# Patient Record
(demographics unavailable — no encounter records)

---

## 2024-09-30 NOTE — ASSESSMENT
[FreeTextEntry1] : Ms. TREV GREENE, 80 year old female, former smoker, w/ hx of Rt lung surgery for benign finding. Diagnosed with with T3 N0 Mo squamous cell cancer RUL (Jan,2022). Has established care with [facility], completed 4 cycles of Jose M adjuvant chemo 5/2022.  Now s/p Right thoracotomy; right upper lobe extended wedge resection; resection of posterior chest wall including ribs 3, 4, and 5; chest wall reconstruction with [place] patch and hilar and mediastinal lymph node sampling on on 6/21/22. Path revealed squamous cell carcinoma, 2.5 cm, G2, +visceral pleura invasion, and invasion of adjacent rib, all margins and LNs are negative, [id number].  Post op, followed with Dr. Barrett; Continue surveillance for now.  Now s/p Left chest wall mediport removal on 05/07/2024.   Here today for follow up with imaging.  I have independently reviewed the medical records and imaging at the time of this office consultation.  Recommendations reviewed with patient during this office visit, and all questions answered; Patient instructed on the importance of follow up and verbalizes understanding.

## 2024-09-30 NOTE — ASSESSMENT
[FreeTextEntry1] : Ms. TREV GREENE, 80 year old female, former smoker, w/ hx of Rt lung surgery for benign finding. Diagnosed with with T3 N0 Mo squamous cell cancer RUL (Jan,2022). Has established care with [facility], completed 4 cycles of Jose M adjuvant chemo 5/2022.  Now s/p Right thoracotomy; right upper lobe extended wedge resection; resection of posterior chest wall including ribs 3, 4, and 5; chest wall reconstruction with [place] patch and hilar and mediastinal lymph node sampling on on 6/21/22. Path revealed squamous cell carcinoma, 2.5 cm, G2, +visceral pleura invasion, and invasion of adjacent rib, all margins and LNs are negative, [id number].  Post op, followed with Dr. Barrett; Continue surveillance for now.  Now s/p Left chest wall mediport removal on 05/07/2024.   Here today for follow up with imaging.  I have independently reviewed the medical records and imaging at the time of this office consultation.  Recommendations reviewed with patient during this office visit, and all questions answered; Patient instructed on the importance of follow up and verbalizes understanding.       No

## 2024-09-30 NOTE — HISTORY OF PRESENT ILLNESS
[FreeTextEntry1] : Ms. TREV GREENE, 80 year old female, former smoker, w/ hx of Rt lung surgery for benign finding. Diagnosed with with T3 N0 Mo squamous cell cancer RUL (Jan,2022). Has established care with [facility], completed 4 cycles of Jose M adjuvant chemo 5/2022.  Now s/p Right thoracotomy; right upper lobe extended wedge resection; resection of posterior chest wall including ribs 3, 4, and 5; chest wall reconstruction with [place] patch and hilar and mediastinal lymph node sampling on on 6/21/22. Path revealed squamous cell carcinoma, 2.5 cm, G2, +visceral pleura invasion, and invasion of adjacent rib, all margins and LNs are negative, [id number].  Post op, followed with Dr. Barrett; Continue surveillance for now.  CT Chest on 9/12/22: - Post op changes - Mild opacity surrounding the chain sutures and 1 cm nodular subpleural opacity within posterior RUL(series 2 image 42)  PET/CT on 12/14/2022: (Ordered by Dr. Barrett) - Patient is status post interval partial resection of the right upper lobe.  - There is a mildly FDG-avid opacity adjacent to chain sutures (SUV 3.7; image 75). The opacity is grossly unchanged as compared to CT dated 9/12/2022, however, due to differences in technique, comparison is limited. - Previously noted 1 cm nodular subpleural opacity within the posterior right upper lobe is not clearly seen on the current study. A dedicated CT of chest is recommended to assess for interval change.  - Emphysema. - Nonspecific segmental hypermetabolism in sigmoid colon, decreased from prior study.  CT C/A/P w/ IV Contrast on 04/13/2023: - Status post posterior right upper and superior segment right lower lobe wedge resection.  - Previously noted perisutural nodularity has decreased (2:33) since October 2022.  - Emphysema is present. - Stable right supraclavicular, upper and lower right paratracheal, AP window nodes, subcarinal and right hilar nodes measuring up to 1.1 cm short axis.  - Moderate-sized hiatal hernia. - Question an enhancing lesion in the sigmoid colon at site of previous FDG avidity on prior PET/CTs, otherwise incompletely evaluated on this study; recommend correlation with colonoscopy.  CT Chest on 12/08/2023:  - Status post wedge resections in the right lung. - Emphysematous changes are present in both lungs.  - New 0.5 cm ill-defined nodule is noted in the posterior segment of the left upper lobe.  - Small hiatal hernia is noted.  01/03/2024: New YVAN nodule, which I think is mucus. I recommend she returns to clinic in 6 months with CT Chest without contrast.   CT Chest on 03/28/2024: - Right lung wedge resections.  - Unchanged upper lobe predominant emphysema and mild traction bronchiectasis in the right lower lobe. - Resolved previously new left upper lobe nodule. - Partial right rib resections.  - Surgical graft of the posterior right chest wall. - Small hiatal hernia.  Now s/p Left chest wall mediport removal on 05/07/2024.   05/22/2024: Port removal site healing well, no sign of infection. Previously noted YVAN nodule has resolved, I recommend she repeats CT Chest without contrast in 09/2024 and return to clinic after to discuss findings.   CT Chest on 09/19/2024: - Right upper and right lower lobe sublobar resections.  - Emphysema. - Unchanged small mediastinal lymph nodes.  - Small hiatal hernia. - Partial resection of multiple right ribs. - Trace pericardial fluid. Coronary artery calcifications. Unchanged dilated right pulmonary artery.   Patient presents to office for follow up.

## 2024-09-30 NOTE — HISTORY OF PRESENT ILLNESS
[FreeTextEntry1] : Ms. TREV GREENE, 80 year old female, former smoker, w/ hx of Rt lung surgery for benign finding. Diagnosed with with T3 N0 Mo squamous cell cancer RUL (Jan,2022). Has established care with [facility], completed 4 cycles of Jose M adjuvant chemo 5/2022.  Now s/p Right thoracotomy; right upper lobe extended wedge resection; resection of posterior chest wall including ribs 3, 4, and 5; chest wall reconstruction with [place] patch and hilar and mediastinal lymph node sampling on on 6/21/22. Path revealed squamous cell carcinoma, 2.5 cm, G2, +visceral pleura invasion, and invasion of adjacent rib, all margins and LNs are negative, [id number].  Post op, followed with Dr. Barrett; Continue surveillance for now.  CT Chest on 9/12/22: - Post op changes - Mild opacity surrounding the chain sutures and 1 cm nodular subpleural opacity within posterior RUL(series 2 image 42)  PET/CT on 12/14/2022: (Ordered by Dr. Barrett) - Patient is status post interval partial resection of the right upper lobe.  - There is a mildly FDG-avid opacity adjacent to chain sutures (SUV 3.7; image 75). The opacity is grossly unchanged as compared to CT dated 9/12/2022, however, due to differences in technique, comparison is limited. - Previously noted 1 cm nodular subpleural opacity within the posterior right upper lobe is not clearly seen on the current study. A dedicated CT of chest is recommended to assess for interval change.  - Emphysema. - Nonspecific segmental hypermetabolism in sigmoid colon, decreased from prior study.  CT C/A/P w/ IV Contrast on 04/13/2023: - Status post posterior right upper and superior segment right lower lobe wedge resection.  - Previously noted perisutural nodularity has decreased (2:33) since October 2022.  - Emphysema is present. - Stable right supraclavicular, upper and lower right paratracheal, AP window nodes, subcarinal and right hilar nodes measuring up to 1.1 cm short axis.  - Moderate-sized hiatal hernia. - Question an enhancing lesion in the sigmoid colon at site of previous FDG avidity on prior PET/CTs, otherwise incompletely evaluated on this study; recommend correlation with colonoscopy.  CT Chest on 12/08/2023:  - Status post wedge resections in the right lung. - Emphysematous changes are present in both lungs.  - New 0.5 cm ill-defined nodule is noted in the posterior segment of the left upper lobe.  - Small hiatal hernia is noted.  01/03/2024: New YVAN nodule, which I think is mucus. I recommend she returns to clinic in 6 months with CT Chest without contrast.   CT Chest on 03/28/2024: - Right lung wedge resections.  - Unchanged upper lobe predominant emphysema and mild traction bronchiectasis in the right lower lobe. - Resolved previously new left upper lobe nodule. - Partial right rib resections.  - Surgical graft of the posterior right chest wall. - Small hiatal hernia.  Now s/p Left chest wall mediport removal on 05/07/2024.   05/22/2024: Port removal site healing well, no sign of infection. Previously noted YVAN nodule has resolved, I recommend she repeats CT Chest without contrast in 09/2024 and return to clinic after to discuss findings.   CT Chest on 09/19/2024: - Right upper and right lower lobe sublobar resections.  - Emphysema. - Unchanged small mediastinal lymph nodes.  - Small hiatal hernia. - Partial resection of multiple right ribs. - Trace pericardial fluid. Coronary artery calcifications. Unchanged dilated right pulmonary artery.   Patient presents to office for follow up.

## 2024-10-16 NOTE — HISTORY OF PRESENT ILLNESS
[FreeTextEntry1] : Ms. TREV GREENE, 80 year old female, former smoker, w/ hx of Rt lung surgery for benign finding. Diagnosed with with T3 N0 Mo squamous cell cancer RUL (Jan,2022). Has established care with [facility], completed 4 cycles of Jose M adjuvant chemo 5/2022.  Now s/p Right thoracotomy; right upper lobe extended wedge resection; resection of posterior chest wall including ribs 3, 4, and 5; chest wall reconstruction with [place] patch and hilar and mediastinal lymph node sampling on on 6/21/22. Path revealed squamous cell carcinoma, 2.5 cm, G2, +visceral pleura invasion, and invasion of adjacent rib, all margins and LNs are negative, [id number].  Post op, followed with Dr. Barrett; Continue surveillance for now.  CT Chest on 9/12/22: - Post op changes - Mild opacity surrounding the chain sutures and 1 cm nodular subpleural opacity within posterior RUL(series 2 image 42)  PET/CT on 12/14/2022: (Ordered by Dr. Barrett) - Patient is status post interval partial resection of the right upper lobe.  - There is a mildly FDG-avid opacity adjacent to chain sutures (SUV 3.7; image 75). The opacity is grossly unchanged as compared to CT dated 9/12/2022, however, due to differences in technique, comparison is limited. - Previously noted 1 cm nodular subpleural opacity within the posterior right upper lobe is not clearly seen on the current study. A dedicated CT of chest is recommended to assess for interval change.  - Emphysema. - Nonspecific segmental hypermetabolism in sigmoid colon, decreased from prior study.  CT C/A/P w/ IV Contrast on 04/13/2023: - Status post posterior right upper and superior segment right lower lobe wedge resection.  - Previously noted perisutural nodularity has decreased (2:33) since October 2022.  - Emphysema is present. - Stable right supraclavicular, upper and lower right paratracheal, AP window nodes, subcarinal and right hilar nodes measuring up to 1.1 cm short axis.  - Moderate-sized hiatal hernia. - Question an enhancing lesion in the sigmoid colon at site of previous FDG avidity on prior PET/CTs, otherwise incompletely evaluated on this study; recommend correlation with colonoscopy.  CT Chest on 12/08/2023:  - Status post wedge resections in the right lung. - Emphysematous changes are present in both lungs.  - New 0.5 cm ill-defined nodule is noted in the posterior segment of the left upper lobe.  - Small hiatal hernia is noted.  01/03/2024: New YVAN nodule, which I think is mucus. I recommend she returns to clinic in 6 months with CT Chest without contrast.   CT Chest on 03/28/2024: - Right lung wedge resections.  - Unchanged upper lobe predominant emphysema and mild traction bronchiectasis in the right lower lobe. - Resolved previously new left upper lobe nodule. - Partial right rib resections.  - Surgical graft of the posterior right chest wall. - Small hiatal hernia.  Now s/p Left chest wall mediport removal on 05/07/2024.   05/22/2024: Port removal site healing well, no sign of infection. Previously noted YVAN nodule has resolved, I recommend she repeats CT Chest without contrast in 09/2024 and return to clinic after to discuss findings.   CT Chest on 09/19/2024: - Right upper and right lower lobe sublobar resections.  - Emphysema. - Unchanged small mediastinal lymph nodes.  - Small hiatal hernia. - Partial resection of multiple right ribs. - Trace pericardial fluid. Coronary artery calcifications. Unchanged dilated right pulmonary artery.  INTERVAL Hx: Hosptalized in September, 2024 for worsening shortness of breath. Diagnosed with ARF w/ hypoxia secondary to COPD exacerbation. D/c'd on home 02. 1 bottle positive BC acinetobacter, lung source vs contaminant? repeat BC NTD - on unasyn, will dw ID re PO abx on dc  Patient presents to office for follow up. + Shortness of breath upon exertion. Now, using supplemental 02 PRN, but has been wearing it continuously. Today, patient denies fevers, chills, lightheadedness, dizziness and palpitations.

## 2024-10-16 NOTE — ASSESSMENT
[FreeTextEntry1] : Ms. TREV GREENE, 80 year old female, former smoker, w/ hx of Rt lung surgery for benign finding. Diagnosed with with T3 N0 Mo squamous cell cancer RUL (Jan,2022). Has established care with [facility], completed 4 cycles of Jose M adjuvant chemo 5/2022.  Now s/p Right thoracotomy; right upper lobe extended wedge resection; resection of posterior chest wall including ribs 3, 4, and 5; chest wall reconstruction with [place] patch and hilar and mediastinal lymph node sampling on on 6/21/22. Path revealed squamous cell carcinoma, 2.5 cm, G2, +visceral pleura invasion, and invasion of adjacent rib, all margins and LNs are negative, [id number].  Post op, followed with Dr. Barrett; Continue surveillance for now.  Now s/p Left chest wall mediport removal on 05/07/2024.   Here today for follow up with imaging.   I have independently reviewed the medical records and imaging at the time of this office consultation. CT imaging reviewed. Patient w/ weakened lungs due to baseline emphysema.   Recommendations reviewed with patient during this office visit, and all questions answered; Patient instructed on the importance of follow up and verbalizes understanding. Bronchovascular markings on current CT noted to be more dense compared to prior exam. Etiology is unclear. Discussed possibility of an infectious process vs changes related to pulmonary HTN. Recently started on sildenafil and Continues to be dependent on supplemental 02. She follows with Dr. Hernandez for medication and 02 management. Discussed repeating a non contrast in a few months to re-evaluate. She is agreeable.   Recommendations reviewed with patient during this office visit, and all questions answered; Patient instructed on the importance of follow up and verbalizes understanding.  I, WILLIAM Mancera, personally performed the evaluation and management (E/M) services for this established patient. That E/M includes conducting the examination, assessing all new/exacerbated conditions, and establishing a new plan of care. Today, My ACP, Dinora Hernandez, was here to observe my evaluation and management services for this patient to be followed going forward.

## 2024-10-16 NOTE — ASSESSMENT
[FreeTextEntry1] : Ms. TREV GREENE, 80 year old female, former smoker, w/ hx of Rt lung surgery for benign finding. Diagnosed with with T3 N0 Mo squamous cell cancer RUL (Jan,2022). Has established care with [facility], completed 4 cycles of Jose M adjuvant chemo 5/2022.  Now s/p Right thoracotomy; right upper lobe extended wedge resection; resection of posterior chest wall including ribs 3, 4, and 5; chest wall reconstruction with [place] patch and hilar and mediastinal lymph node sampling on on 6/21/22. Path revealed squamous cell carcinoma, 2.5 cm, G2, +visceral pleura invasion, and invasion of adjacent rib, all margins and LNs are negative, [id number].  Post op, followed with Dr. Barrett; Continue surveillance for now.  Now s/p Left chest wall mediport removal on 05/07/2024.   Here today for follow up with imaging.   I have independently reviewed the medical records and imaging at the time of this office consultation. CT imaging reviewed. Patient w/ weakened lungs due to baseline emphysema.   Recommendations reviewed with patient during this office visit, and all questions answered; Patient instructed on the importance of follow up and verbalizes understanding. Bronchovascular markings on current CT noted to be more dense compared to prior exam. Etiology is unclear. Discussed possibility of an infectious process vs changes related to pulmonary HTN. Recently started on sildenafil and Continues to be dependent on supplemental 02. She follows with Dr. Hernandez for medication and 02 management. Discussed repeating a non contrast in a few months to re-evaluate. She is agreeable.   Recommendations reviewed with patient during this office visit, and all questions answered; Patient instructed on the importance of follow up and verbalizes understanding.  I, WILLIAM Mancera, personally performed the evaluation and management (E/M) services for this established patient. That E/M includes conducting the examination, assessing all new/exacerbated conditions, and establishing a new plan of care. Today, My ACP, Dinora Hernandez, was here to observe my evaluation and management services for this patient to be followed going forward.

## 2024-10-16 NOTE — HISTORY OF PRESENT ILLNESS
[FreeTextEntry1] : Ms. TREV GREENE, 80 year old female, former smoker, w/ hx of Rt lung surgery for benign finding. Diagnosed with with T3 N0 Mo squamous cell cancer RUL (Jan,2022). Has established care with [facility], completed 4 cycles of Jose M adjuvant chemo 5/2022.  Now s/p Right thoracotomy; right upper lobe extended wedge resection; resection of posterior chest wall including ribs 3, 4, and 5; chest wall reconstruction with [place] patch and hilar and mediastinal lymph node sampling on on 6/21/22. Path revealed squamous cell carcinoma, 2.5 cm, G2, +visceral pleura invasion, and invasion of adjacent rib, all margins and LNs are negative, [id number].  Post op, followed with Dr. Barrett; Continue surveillance for now.  CT Chest on 9/12/22: - Post op changes - Mild opacity surrounding the chain sutures and 1 cm nodular subpleural opacity within posterior RUL(series 2 image 42)  PET/CT on 12/14/2022: (Ordered by Dr. Barrett) - Patient is status post interval partial resection of the right upper lobe.  - There is a mildly FDG-avid opacity adjacent to chain sutures (SUV 3.7; image 75). The opacity is grossly unchanged as compared to CT dated 9/12/2022, however, due to differences in technique, comparison is limited. - Previously noted 1 cm nodular subpleural opacity within the posterior right upper lobe is not clearly seen on the current study. A dedicated CT of chest is recommended to assess for interval change.  - Emphysema. - Nonspecific segmental hypermetabolism in sigmoid colon, decreased from prior study.  CT C/A/P w/ IV Contrast on 04/13/2023: - Status post posterior right upper and superior segment right lower lobe wedge resection.  - Previously noted perisutural nodularity has decreased (2:33) since October 2022.  - Emphysema is present. - Stable right supraclavicular, upper and lower right paratracheal, AP window nodes, subcarinal and right hilar nodes measuring up to 1.1 cm short axis.  - Moderate-sized hiatal hernia. - Question an enhancing lesion in the sigmoid colon at site of previous FDG avidity on prior PET/CTs, otherwise incompletely evaluated on this study; recommend correlation with colonoscopy.  CT Chest on 12/08/2023:  - Status post wedge resections in the right lung. - Emphysematous changes are present in both lungs.  - New 0.5 cm ill-defined nodule is noted in the posterior segment of the left upper lobe.  - Small hiatal hernia is noted.  01/03/2024: New YVAN nodule, which I think is mucus. I recommend she returns to clinic in 6 months with CT Chest without contrast.   CT Chest on 03/28/2024: - Right lung wedge resections.  - Unchanged upper lobe predominant emphysema and mild traction bronchiectasis in the right lower lobe. - Resolved previously new left upper lobe nodule. - Partial right rib resections.  - Surgical graft of the posterior right chest wall. - Small hiatal hernia.  Now s/p Left chest wall mediport removal on 05/07/2024.   05/22/2024: Port removal site healing well, no sign of infection. Previously noted YVAN nodule has resolved, I recommend she repeats CT Chest without contrast in 09/2024 and return to clinic after to discuss findings.   CT Chest on 09/19/2024: - Right upper and right lower lobe sublobar resections.  - Emphysema. - Unchanged small mediastinal lymph nodes.  - Small hiatal hernia. - Partial resection of multiple right ribs. - Trace pericardial fluid. Coronary artery calcifications. Unchanged dilated right pulmonary artery.  INTERVAL Hx: Hosptalized in September, 2024 for worsening shortness of breath. Diagnosed with ARF w/ hypoxia secondary to COPD exacerbation. D/c'd on home 02. 1 bottle positive BC acinetobacter, lung source vs contaminant? repeat BC NTD - on unasyn, will dw ID re PO abx on dc  Patient presents to office for follow up. + Shortness of breath upon exertion. Now, using supplemental 02 PRN, but has been wearing it continuously. Today, patient denies fevers, chills, lightheadedness, dizziness and palpitations.

## 2024-10-16 NOTE — PHYSICAL EXAM
[Sclera] : the sclera and conjunctiva were normal [PERRL With Normal Accommodation] : pupils were equal in size, round, and reactive to light [Extraocular Movements] : extraocular movements were intact [] : no respiratory distress [Respiration, Rhythm And Depth] : normal respiratory rhythm and effort [Exaggerated Use Of Accessory Muscles For Inspiration] : no accessory muscle use [Auscultation Breath Sounds / Voice Sounds] : lungs were clear to auscultation bilaterally [Heart Rate And Rhythm] : heart rate was normal and rhythm regular [Examination Of The Chest] : the chest was normal in appearance [Chest Visual Inspection Thoracic Asymmetry] : no chest asymmetry [Diminished Respiratory Excursion] : normal chest expansion [2+] : left 2+ [Cervical Lymph Nodes Enlarged Posterior Bilaterally] : posterior cervical [Cervical Lymph Nodes Enlarged Anterior Bilaterally] : anterior cervical [Supraclavicular Lymph Nodes Enlarged Bilaterally] : supraclavicular [No CVA Tenderness] : no ~M costovertebral angle tenderness [No Spinal Tenderness] : no spinal tenderness [Involuntary Movements] : no involuntary movements were seen [Skin Color & Pigmentation] : normal skin color and pigmentation [Oriented To Time, Place, And Person] : oriented to person, place, and time

## 2024-10-16 NOTE — HISTORY OF PRESENT ILLNESS
[FreeTextEntry1] : Ms. TREV GREENE, 80 year old female, former smoker, w/ hx of Rt lung surgery for benign finding. Diagnosed with with T3 N0 Mo squamous cell cancer RUL (Jan,2022). Has established care with [facility], completed 4 cycles of Jose M adjuvant chemo 5/2022.  Now s/p Right thoracotomy; right upper lobe extended wedge resection; resection of posterior chest wall including ribs 3, 4, and 5; chest wall reconstruction with [place] patch and hilar and mediastinal lymph node sampling on on 6/21/22. Path revealed squamous cell carcinoma, 2.5 cm, G2, +visceral pleura invasion, and invasion of adjacent rib, all margins and LNs are negative, [id number].  Post op, followed with Dr. Barrett; Continue surveillance for now.  CT Chest on 9/12/22: - Post op changes - Mild opacity surrounding the chain sutures and 1 cm nodular subpleural opacity within posterior RUL(series 2 image 42)  PET/CT on 12/14/2022: (Ordered by Dr. Barrett) - Patient is status post interval partial resection of the right upper lobe.  - There is a mildly FDG-avid opacity adjacent to chain sutures (SUV 3.7; image 75). The opacity is grossly unchanged as compared to CT dated 9/12/2022, however, due to differences in technique, comparison is limited. - Previously noted 1 cm nodular subpleural opacity within the posterior right upper lobe is not clearly seen on the current study. A dedicated CT of chest is recommended to assess for interval change.  - Emphysema. - Nonspecific segmental hypermetabolism in sigmoid colon, decreased from prior study.  CT C/A/P w/ IV Contrast on 04/13/2023: - Status post posterior right upper and superior segment right lower lobe wedge resection.  - Previously noted perisutural nodularity has decreased (2:33) since October 2022.  - Emphysema is present. - Stable right supraclavicular, upper and lower right paratracheal, AP window nodes, subcarinal and right hilar nodes measuring up to 1.1 cm short axis.  - Moderate-sized hiatal hernia. - Question an enhancing lesion in the sigmoid colon at site of previous FDG avidity on prior PET/CTs, otherwise incompletely evaluated on this study; recommend correlation with colonoscopy.  CT Chest on 12/08/2023:  - Status post wedge resections in the right lung. - Emphysematous changes are present in both lungs.  - New 0.5 cm ill-defined nodule is noted in the posterior segment of the left upper lobe.  - Small hiatal hernia is noted.  01/03/2024: New YVAN nodule, which I think is mucus. I recommend she returns to clinic in 6 months with CT Chest without contrast.   CT Chest on 03/28/2024: - Right lung wedge resections.  - Unchanged upper lobe predominant emphysema and mild traction bronchiectasis in the right lower lobe. - Resolved previously new left upper lobe nodule. - Partial right rib resections.  - Surgical graft of the posterior right chest wall. - Small hiatal hernia.  Now s/p Left chest wall mediport removal on 05/07/2024.   05/22/2024: Port removal site healing well, no sign of infection. Previously noted YVAN nodule has resolved, I recommend she repeats CT Chest without contrast in 09/2024 and return to clinic after to discuss findings.   CT Chest on 09/19/2024: - Right upper and right lower lobe sublobar resections.  - Emphysema. - Unchanged small mediastinal lymph nodes.  - Small hiatal hernia. - Partial resection of multiple right ribs. - Trace pericardial fluid. Coronary artery calcifications. Unchanged dilated right pulmonary artery.  INTERVAL Hx: Hosptalized in September, 2024 for worsening shortness of breath. Diagnosed with ARF w/ hypoxia secondary to COPD exacerbation. D/c'd on home 02. 1 bottle positive BC acinetobacter, lung source vs contaminant? repeat BC NTD - on unasyn, will dw ID re PO abx on dc  Patient presents to office for follow up. + Shortness of breath upon exertion. Now, using supplemental 02 PRN, but has been wearing it continuously. Today, patient denies fevers, chills, lightheadedness, dizziness and palpitations.

## 2024-10-16 NOTE — ASSESSMENT
[FreeTextEntry1] : Ms. TREV GREENE, 80 year old female, former smoker, w/ hx of Rt lung surgery for benign finding. Diagnosed with with T3 N0 Mo squamous cell cancer RUL (Jan,2022). Has established care with [facility], completed 4 cycles of Jose M adjuvant chemo 5/2022.  Now s/p Right thoracotomy; right upper lobe extended wedge resection; resection of posterior chest wall including ribs 3, 4, and 5; chest wall reconstruction with [place] patch and hilar and mediastinal lymph node sampling on on 6/21/22. Path revealed squamous cell carcinoma, 2.5 cm, G2, +visceral pleura invasion, and invasion of adjacent rib, all margins and LNs are negative, [id number].  Post op, followed with Dr. Barrett; Continue surveillance for now.  Now s/p Left chest wall mediport removal on 05/07/2024.   Here today for follow up with imaging.   I have independently reviewed the medical records and imaging at the time of this office consultation. CT imaging reviewed. Patient w/ weakened lungs due to baseline emphysema.   Recommendations reviewed with patient during this office visit, and all questions answered; Patient instructed on the importance of follow up and verbalizes understanding. Bronchovascular markings on current CT noted to be more dense compared to prior exam. Etiology is unclear. Discussed possibility of an infectious process vs changes related to pulmonary HTN. Recently started on sildenafil and Continues to be dependent on supplemental 02. She follows with Dr. Hernandez for medication and 02 management. Discussed repeating a non contrast in a few months to re-evaluate. She is agreeable.   Recommendations reviewed with patient during this office visit, and all questions answered; Patient instructed on the importance of follow up and verbalizes understanding.  I, WILLIAM Mancera, personally performed the evaluation and management (E/M) services for this established patient. That E/M includes conducting the examination, assessing all new/exacerbated conditions, and establishing a new plan of care. Today, My ACP, Dinora Hernandez, was here to observe my evaluation and management services for this patient to be followed going forward.

## 2024-10-16 NOTE — DATA REVIEWED
ANTICOAGULATION     Donato Brownkrik is overdue for INR check.      [employer] message sent.    Annette Preciado RN       [FreeTextEntry1] : I have independently reviewed the following: CT Chest on 09/19/2024

## 2024-10-22 NOTE — ASSESSMENT
[FreeTextEntry1] : Ms. TREV GREENE, 80 year old female, former smoker, w/ hx of Rt lung surgery for benign finding. Diagnosed with with T3 N0 Mo squamous cell cancer RUL (Jan,2022). Has established care with [facility], completed 4 cycles of Jose M adjuvant chemo 5/2022.  Now s/p Right thoracotomy; right upper lobe extended wedge resection; resection of posterior chest wall including ribs 3, 4, and 5; chest wall reconstruction with [place] patch and hilar and mediastinal lymph node sampling on on 6/21/22. Path revealed squamous cell carcinoma, 2.5 cm, G2, +visceral pleura invasion, and invasion of adjacent rib, all margins and LNs are negative, [id number].  Post op, followed with Dr. Barrett; Continue surveillance for now.  Now s/p Left chest wall mediport removal on 05/07/2024.   Here today for clinical follow up.   I have independently reviewed the medical records and imaging at the time of this office consultation.   Recommendations reviewed with patient during this office visit, and all questions answered; Patient instructed on the importance of follow up and verbalizes understanding.

## 2024-10-22 NOTE — HISTORY OF PRESENT ILLNESS
[FreeTextEntry1] : Ms. TREV GREENE, 80 year old female, former smoker, w/ hx of Rt lung surgery for benign finding. Diagnosed with with T3 N0 Mo squamous cell cancer RUL (Jan,2022). Has established care with [facility], completed 4 cycles of Jose M adjuvant chemo 5/2022.  Now s/p Right thoracotomy; right upper lobe extended wedge resection; resection of posterior chest wall including ribs 3, 4, and 5; chest wall reconstruction with [place] patch and hilar and mediastinal lymph node sampling on on 6/21/22. Path revealed squamous cell carcinoma, 2.5 cm, G2, +visceral pleura invasion, and invasion of adjacent rib, all margins and LNs are negative, [id number].  Post op, followed with Dr. Barrett; Continue surveillance for now.  CT Chest on 9/12/22: - Post op changes - Mild opacity surrounding the chain sutures and 1 cm nodular subpleural opacity within posterior RUL(series 2 image 42)  PET/CT on 12/14/2022: (Ordered by Dr. Barrett) - Patient is status post interval partial resection of the right upper lobe.  - There is a mildly FDG-avid opacity adjacent to chain sutures (SUV 3.7; image 75). The opacity is grossly unchanged as compared to CT dated 9/12/2022, however, due to differences in technique, comparison is limited. - Previously noted 1 cm nodular subpleural opacity within the posterior right upper lobe is not clearly seen on the current study. A dedicated CT of chest is recommended to assess for interval change.  - Emphysema. - Nonspecific segmental hypermetabolism in sigmoid colon, decreased from prior study.  CT C/A/P w/ IV Contrast on 04/13/2023: - Status post posterior right upper and superior segment right lower lobe wedge resection.  - Previously noted perisutural nodularity has decreased (2:33) since October 2022.  - Emphysema is present. - Stable right supraclavicular, upper and lower right paratracheal, AP window nodes, subcarinal and right hilar nodes measuring up to 1.1 cm short axis.  - Moderate-sized hiatal hernia. - Question an enhancing lesion in the sigmoid colon at site of previous FDG avidity on prior PET/CTs, otherwise incompletely evaluated on this study; recommend correlation with colonoscopy.  CT Chest on 12/08/2023:  - Status post wedge resections in the right lung. - Emphysematous changes are present in both lungs.  - New 0.5 cm ill-defined nodule is noted in the posterior segment of the left upper lobe.  - Small hiatal hernia is noted.  01/03/2024: New YVAN nodule, which I think is mucus. I recommend she returns to clinic in 6 months with CT Chest without contrast.   CT Chest on 03/28/2024: - Right lung wedge resections.  - Unchanged upper lobe predominant emphysema and mild traction bronchiectasis in the right lower lobe. - Resolved previously new left upper lobe nodule. - Partial right rib resections.  - Surgical graft of the posterior right chest wall. - Small hiatal hernia.  Now s/p Left chest wall mediport removal on 05/07/2024.   05/22/2024: Port removal site healing well, no sign of infection. Previously noted YVAN nodule has resolved, I recommend she repeats CT Chest without contrast in 09/2024 and return to clinic after to discuss findings.   CT Chest on 09/19/2024: - Right upper and right lower lobe sublobar resections.  - Emphysema. - Unchanged small mediastinal lymph nodes.  - Small hiatal hernia. - Partial resection of multiple right ribs. - Trace pericardial fluid. Coronary artery calcifications. Unchanged dilated right pulmonary artery.  INTERVAL Hx: Hosptalized in September, 2024 for worsening shortness of breath. Diagnosed with ARF w/ hypoxia secondary to COPD exacerbation. D/c'd on home 02. 1 bottle positive BC acinetobacter, lung source vs contaminant? repeat BC NTD - on unasyn, will dw ID re PO abx on dc  10/16/2024: Seen in office, + Shortness of breath upon exertion. Now, using supplemental 02 PRN, but has been wearing it continuously. CT imaging reviewed. Patient w/ weakened lungs due to baseline emphysema. Bronchovascular markings on current CT noted to be more dense compared to prior exam. Etiology is unclear. Discussed possibility of an infectious process vs changes related to pulmonary HTN. Recently started on sildenafil and Continues to be dependent on supplemental 02. She follows with Dr. Hernandez for medication and 02 management. Discussed repeating a non contrast in a few months to re-evaluate.  Patient presents to office for clinical follow up.

## 2024-10-24 NOTE — ASSESSMENT
[FreeTextEntry1] : 81 yo w, former smoker, with recently diagnosed sq cell ca of the lung, T3N0M0 s/p med which showed no LN involvement. Given emerging data supporting IO in addition to chemo, we discussed- carbo AUC 5 day 1, Abraxane 100 mg/m2 days 1 and 8 and Keytruda 200 mg day 1. This treatment plan was made prior to recent approval of chemoIO in the induction space. She completed all 4 planned cycles successfully without much AEs, and with no interruptions. No AEs except for mild constipation which she managed with OTC meds. Of note, tumor was PDL1 negative, NGS showed RB1 and TP53 muts Guardant blood test showed MAPK1 mut, nothing targetable She subsequently underwent successful surgical resection There was viable cancer but significant response noted (10-20% viable tumor). There was VPI, upstaging to T3 but no other risk factors Extrapolating from Checkmate 816 data, we elected to watch closely without any further treatment We sent Bong ctDNA testing on blood, results are on hold since tumor material sent was QNS due to amount of necrotic tissue and not enough viable tumor cells. Another sample sent however unlikely to yield result. Scans from September 2022 reviewed and shows status post interval partial resection of right upper lobe. 1 cm nodular opacity adjacent to chain suture within posterior right upper lobe likely post-surgical scar tissue. To follow up on this, we obtained a PET/CT 12/14/22, which showed post-surgical changes, and there was a mild FDG-avid opacity adjacent to chain sutures is indeterminate. CT in Dec 2023 showed JOSSELIN except for, new 0.5cm ill-defined lesion in YVAN, unclear etiology.  This resolved on scan in March 2024 which I reviewed independently. CT Chest on 09/19/2024: - Right upper and right lower lobe sublobar resections. - Emphysema. - Unchanged small mediastinal lymph nodes. - Small hiatal hernia. - Partial resection of multiple right ribs. - Trace pericardial fluid. Coronary artery calcifications. Unchanged dilated right pulmonary artery. Unfortunately, hospitalized in September 2024 for worsening shortness of breath. Diagnosed with ARF w/ hypoxia secondary to COPD exacerbation. D/c'd on home 02. 1 bottle positive BC acinetobacter, lung source vs contaminant? repeat BC NTD Treated with antibiotics Now on home O2 [id number]: Seen in office, + Shortness of breath upon exertion. Now, using supplemental 02 24/7 but still hypoxic to 89%- 90% on 2-3L. CT imaging reviewed. Patient w/ weakened lungs due to baseline emphysema. Bronchovascular markings on current CT noted to be denser compared to prior exam. Etiology is unclear. Pt is seeing ludin, cardiology, started on new MDI which she just started I think she would benefit from course of steroids, given wheeze on exam.  Sent script for prednisone 20 mg daily for 7 days with breakfast.  Recently started on sildenafil which pt has been using.  Has follow up with pulm and cardiology ASAP Reassured pt that from cancer perspective, there is JOSSELIN OV in 3 months  Patient presents to office for clinical follow up.

## 2024-10-24 NOTE — REASON FOR VISIT
[Follow-Up Visit] : a follow-up [Initial Consultation] : an initial consultation [FreeTextEntry2] : lung cancer

## 2024-10-24 NOTE — REVIEW OF SYSTEMS
[Fatigue] : fatigue [Negative] : Allergic/Immunologic [Fever] : no fever [Chills] : no chills [Recent Change In Weight] : ~T no recent weight change [Cough] : no cough [SOB on Exertion] : no shortness of breath during exertion [FreeTextEntry6] : Occasional shortness of breath, now on O2

## 2024-10-24 NOTE — PHYSICAL EXAM
[Restricted in physically strenuous activity but ambulatory and able to carry out work of a light or sedentary nature] : Status 1- Restricted in physically strenuous activity but ambulatory and able to carry out work of a light or sedentary nature, e.g., light house work, office work [Normal] : affect appropriate [de-identified] : No paraspinal tenderness but tenderness to palpation of right scapular area (surgical site) [de-identified] : rt thoracotomy scar

## 2024-11-13 NOTE — HISTORY OF PRESENT ILLNESS
[FreeTextEntry1] : Ms. TREV GREENE, 80 year old female, former smoker, w/ hx of Rt lung surgery for benign finding. Diagnosed with with T3 N0 Mo squamous cell cancer RUL (Jan,2022). Has established care with [facility], completed 4 cycles of Jose M adjuvant chemo 5/2022.  Now s/p Right thoracotomy; right upper lobe extended wedge resection; resection of posterior chest wall including ribs 3, 4, and 5; chest wall reconstruction with [place] patch and hilar and mediastinal lymph node sampling on on 6/21/22. Path revealed squamous cell carcinoma, 2.5 cm, G2, +visceral pleura invasion, and invasion of adjacent rib, all margins and LNs are negative, [id number].  Post op, followed with Dr. Barrett; Continue surveillance for now.  CT Chest on 9/12/22: - Post op changes - Mild opacity surrounding the chain sutures and 1 cm nodular subpleural opacity within posterior RUL(series 2 image 42)  PET/CT on 12/14/2022: (Ordered by Dr. Barrett) - Patient is status post interval partial resection of the right upper lobe.  - There is a mildly FDG-avid opacity adjacent to chain sutures (SUV 3.7; image 75). The opacity is grossly unchanged as compared to CT dated 9/12/2022, however, due to differences in technique, comparison is limited. - Previously noted 1 cm nodular subpleural opacity within the posterior right upper lobe is not clearly seen on the current study. A dedicated CT of chest is recommended to assess for interval change.  - Emphysema. - Nonspecific segmental hypermetabolism in sigmoid colon, decreased from prior study.  CT C/A/P w/ IV Contrast on 04/13/2023: - Status post posterior right upper and superior segment right lower lobe wedge resection.  - Previously noted perisutural nodularity has decreased (2:33) since October 2022.  - Emphysema is present. - Stable right supraclavicular, upper and lower right paratracheal, AP window nodes, subcarinal and right hilar nodes measuring up to 1.1 cm short axis.  - Moderate-sized hiatal hernia. - Question an enhancing lesion in the sigmoid colon at site of previous FDG avidity on prior PET/CTs, otherwise incompletely evaluated on this study; recommend correlation with colonoscopy.  CT Chest on 12/08/2023:  - Status post wedge resections in the right lung. - Emphysematous changes are present in both lungs.  - New 0.5 cm ill-defined nodule is noted in the posterior segment of the left upper lobe.  - Small hiatal hernia is noted.  01/03/2024: New YVAN nodule, which I think is mucus. I recommend she returns to clinic in 6 months with CT Chest without contrast.   CT Chest on 03/28/2024: - Right lung wedge resections.  - Unchanged upper lobe predominant emphysema and mild traction bronchiectasis in the right lower lobe. - Resolved previously new left upper lobe nodule. - Partial right rib resections.  - Surgical graft of the posterior right chest wall. - Small hiatal hernia.  Now s/p Left chest wall mediport removal on 05/07/2024.   05/22/2024: Port removal site healing well, no sign of infection. Previously noted YVAN nodule has resolved, I recommend she repeats CT Chest without contrast in 09/2024 and return to clinic after to discuss findings.   CT Chest on 09/19/2024: - Right upper and right lower lobe sublobar resections.  - Emphysema. - Unchanged small mediastinal lymph nodes.  - Small hiatal hernia. - Partial resection of multiple right ribs. - Trace pericardial fluid. Coronary artery calcifications. Unchanged dilated right pulmonary artery.  INTERVAL Hx: Hosptalized in September, 2024 for worsening shortness of breath. Diagnosed with ARF w/ hypoxia secondary to COPD exacerbation. D/c'd on home 02. 1 bottle positive BC acinetobacter, lung source vs contaminant? repeat BC NTD - on unasyn, will dw ID re PO abx on dc  10/16/2024: Seen in office, + Shortness of breath upon exertion. Now, using supplemental 02 PRN, but has been wearing it continuously. CT imaging reviewed. Patient w/ weakened lungs due to baseline emphysema. Bronchovascular markings on current CT noted to be more dense compared to prior exam. Etiology is unclear. Discussed possibility of an infectious process vs changes related to pulmonary HTN. Recently started on sildenafil and Continues to be dependent on supplemental 02. She follows with Dr. Hernandez for medication and 02 management. Discussed repeating a non contrast in a few months to re-evaluate.  10/24/2024: Seen by Dr. Seetharamu, noted with wheeze. Started on Prednisone 20 mg daily x 7days. Recommended to follow up with pulm and cards.   Patient presents to office for clinical follow up. + Shortness of breath upon exertion. Completed course of Prednisone. Uses Home 02 PRN. No fevers, chills, lightheadedness or dizziness.

## 2024-11-13 NOTE — ASSESSMENT
[FreeTextEntry1] : Ms. TREV GREENE, 80 year old female, former smoker, w/ hx of Rt lung surgery for benign finding. Diagnosed with with T3 N0 Mo squamous cell cancer RUL (Jan,2022). Has established care with [facility], completed 4 cycles of Jose M adjuvant chemo 5/2022.  Now s/p Right thoracotomy; right upper lobe extended wedge resection; resection of posterior chest wall including ribs 3, 4, and 5; chest wall reconstruction with [place] patch and hilar and mediastinal lymph node sampling on on 6/21/22. Path revealed squamous cell carcinoma, 2.5 cm, G2, +visceral pleura invasion, and invasion of adjacent rib, all margins and LNs are negative, [id number].  Post op, followed with Dr. Barrett; Continue surveillance for now.  Now s/p Left chest wall mediport removal on 05/07/2024.   Here today for clinical follow up.   I have independently reviewed the medical records and imaging at the time of this office consultation. Patient continues to have shortness of breath upon exertion. Has been using supplemental 02 as needed. Ambulated within the office on room air and 02 Sat noted to drop into 70's upon walking. HR maintained in 80's and patient was visibly short of breath. Instructed to use supplemental 02 to maintain 02 sat above 90%. She will be following up with Dr. Hernandez today. Instructed to return to clinic in December, via TTM for symptom check.   I have independently reviewed the medical records and imaging at the time of this office consultation, and discussed the following interpretations with the patient:  I, WILLIAM Mancera, personally performed the evaluation and management (E/M) services for this established patient. That E/M includes assessing all new/exacerbated conditions, and establishing a new plan of care. Today, My ACP, Dinora Hernandez, was here to observe my evaluation and management services for this patient to be followed going forward.

## 2024-11-13 NOTE — ASSESSMENT
[FreeTextEntry1] : Ms. TREV GREENE, 80 year old female, former smoker, w/ hx of Rt lung surgery for benign finding. Diagnosed with with T3 N0 Mo squamous cell cancer RUL (Jan,2022). Has established care with [facility], completed 4 cycles of Jose M adjuvant chemo 5/2022.  Now s/p Right thoracotomy; right upper lobe extended wedge resection; resection of posterior chest wall including ribs 3, 4, and 5; chest wall reconstruction with [place] patch and hilar and mediastinal lymph node sampling on on 6/21/22. Path revealed squamous cell carcinoma, 2.5 cm, G2, +visceral pleura invasion, and invasion of adjacent rib, all margins and LNs are negative, [id number].  Post op, followed with Dr. Barrett; Continue surveillance for now.  Now s/p Left chest wall mediport removal on 05/07/2024.   Here today for clinical follow up.   I have independently reviewed the medical records and imaging at the time of this office consultation. Patient continues to have shortness of breath upon exertion. Has been using supplemental 02 as needed. Ambulated within the office on room air and 02 Sat noted to drop into 70's upon walking. HR maintained in 80's and patient was visibly short of breath. Instructed to use supplemental 02 to maintain 02 sat above 90%. She will be following up with Dr. Hernandez today. Instructed to return to clinic in December, via TTM for symptom check.   I have independently reviewed the medical records and imaging at the time of this office consultation, and discussed the following interpretations with the patient:  I, WILLIAM Mancera, personally performed the evaluation and management (E/M) services for this established patient. That E/M includes assessing all new/exacerbated conditions, and establishing a new plan of care. Today, My ACP, Dinora Hernandez, was here to observe my evaluation and management services for this patient to be followed going forward.

## 2024-11-13 NOTE — HISTORY OF PRESENT ILLNESS
[FreeTextEntry1] : Ms. TREV GREENE, 80 year old female, former smoker, w/ hx of Rt lung surgery for benign finding. Diagnosed with with T3 N0 Mo squamous cell cancer RUL (Jan,2022). Has established care with [facility], completed 4 cycles of Jose M adjuvant chemo 5/2022.  Now s/p Right thoracotomy; right upper lobe extended wedge resection; resection of posterior chest wall including ribs 3, 4, and 5; chest wall reconstruction with [place] patch and hilar and mediastinal lymph node sampling on on 6/21/22. Path revealed squamous cell carcinoma, 2.5 cm, G2, +visceral pleura invasion, and invasion of adjacent rib, all margins and LNs are negative, [id number].  Post op, followed with Dr. Barrett; Continue surveillance for now.  CT Chest on 9/12/22: - Post op changes - Mild opacity surrounding the chain sutures and 1 cm nodular subpleural opacity within posterior RUL(series 2 image 42)  PET/CT on 12/14/2022: (Ordered by Dr. Barrett) - Patient is status post interval partial resection of the right upper lobe.  - There is a mildly FDG-avid opacity adjacent to chain sutures (SUV 3.7; image 75). The opacity is grossly unchanged as compared to CT dated 9/12/2022, however, due to differences in technique, comparison is limited. - Previously noted 1 cm nodular subpleural opacity within the posterior right upper lobe is not clearly seen on the current study. A dedicated CT of chest is recommended to assess for interval change.  - Emphysema. - Nonspecific segmental hypermetabolism in sigmoid colon, decreased from prior study.  CT C/A/P w/ IV Contrast on 04/13/2023: - Status post posterior right upper and superior segment right lower lobe wedge resection.  - Previously noted perisutural nodularity has decreased (2:33) since October 2022.  - Emphysema is present. - Stable right supraclavicular, upper and lower right paratracheal, AP window nodes, subcarinal and right hilar nodes measuring up to 1.1 cm short axis.  - Moderate-sized hiatal hernia. - Question an enhancing lesion in the sigmoid colon at site of previous FDG avidity on prior PET/CTs, otherwise incompletely evaluated on this study; recommend correlation with colonoscopy.  CT Chest on 12/08/2023:  - Status post wedge resections in the right lung. - Emphysematous changes are present in both lungs.  - New 0.5 cm ill-defined nodule is noted in the posterior segment of the left upper lobe.  - Small hiatal hernia is noted.  01/03/2024: New YVAN nodule, which I think is mucus. I recommend she returns to clinic in 6 months with CT Chest without contrast.   CT Chest on 03/28/2024: - Right lung wedge resections.  - Unchanged upper lobe predominant emphysema and mild traction bronchiectasis in the right lower lobe. - Resolved previously new left upper lobe nodule. - Partial right rib resections.  - Surgical graft of the posterior right chest wall. - Small hiatal hernia.  Now s/p Left chest wall mediport removal on 05/07/2024.   05/22/2024: Port removal site healing well, no sign of infection. Previously noted YVAN nodule has resolved, I recommend she repeats CT Chest without contrast in 09/2024 and return to clinic after to discuss findings.   CT Chest on 09/19/2024: - Right upper and right lower lobe sublobar resections.  - Emphysema. - Unchanged small mediastinal lymph nodes.  - Small hiatal hernia. - Partial resection of multiple right ribs. - Trace pericardial fluid. Coronary artery calcifications. Unchanged dilated right pulmonary artery.  INTERVAL Hx: Hosptalized in September, 2024 for worsening shortness of breath. Diagnosed with ARF w/ hypoxia secondary to COPD exacerbation. D/c'd on home 02. 1 bottle positive BC acinetobacter, lung source vs contaminant? repeat BC NTD - on unasyn, will dw ID re PO abx on dc  10/16/2024: Seen in office, + Shortness of breath upon exertion. Now, using supplemental 02 PRN, but has been wearing it continuously. CT imaging reviewed. Patient w/ weakened lungs due to baseline emphysema. Bronchovascular markings on current CT noted to be more dense compared to prior exam. Etiology is unclear. Discussed possibility of an infectious process vs changes related to pulmonary HTN. Recently started on sildenafil and Continues to be dependent on supplemental 02. She follows with Dr. Hernandez for medication and 02 management. Discussed repeating a non contrast in a few months to re-evaluate.  10/24/2024: Seen by Dr. Seetharamu, noted with wheeze. Started on Prednisone 20 mg daily x 7days. Recommended to follow up with pulm and cards.   Patient presents to office for clinical follow up. + Shortness of breath upon exertion. Completed course of Prednisone. Uses Home 02 PRN. No fevers, chills, lightheadedness or dizziness.

## 2024-11-13 NOTE — ASSESSMENT
[FreeTextEntry1] : Ms. TREV GREENE, 80 year old female, former smoker, w/ hx of Rt lung surgery for benign finding. Diagnosed with with T3 N0 Mo squamous cell cancer RUL (Jan,2022). Has established care with [facility], completed 4 cycles of Jose M adjuvant chemo 5/2022.  Now s/p Right thoracotomy; right upper lobe extended wedge resection; resection of posterior chest wall including ribs 3, 4, and 5; chest wall reconstruction with [place] patch and hilar and mediastinal lymph node sampling on on 6/21/22. Path revealed squamous cell carcinoma, 2.5 cm, G2, +visceral pleura invasion, and invasion of adjacent rib, all margins and LNs are negative, [id number].  Post op, followed with Dr. Barrett; Continue surveillance for now.  Now s/p Left chest wall mediport removal on 05/07/2024.   Here today for clinical follow up.   I have independently reviewed the medical records and imaging at the time of this office consultation. Patient continues to have shortness of breath upon exertion. Has been using supplemental 02 as needed. Ambulated within the office on room air and 02 Sat noted to drop into 70's upon walking. HR maintained in 80's and patient was visibly short of breath. Instructed to use supplemental 02 to maintain 02 sat above 90%. She will be following up with Dr. Hernandez today. Instructed to return to clinic in December, via TTM for symptom check.   I have independently reviewed the medical records and imaging at the time of this office consultation, and discussed the following interpretations with the patient:  I, WILLIAM Mancera, personally performed the evaluation and management (E/M) services for this established patient. That E/M includes assessing all new/exacerbated conditions, and establishing a new plan of care. Today, My ACP, Dinora Hernandez, was here to observe my evaluation and management services for this patient to be followed going forward.

## 2024-11-13 NOTE — HISTORY OF PRESENT ILLNESS
[FreeTextEntry1] : Ms. TREV GREENE, 80 year old female, former smoker, w/ hx of Rt lung surgery for benign finding. Diagnosed with with T3 N0 Mo squamous cell cancer RUL (Jan,2022). Has established care with [facility], completed 4 cycles of Jose M adjuvant chemo 5/2022.  Now s/p Right thoracotomy; right upper lobe extended wedge resection; resection of posterior chest wall including ribs 3, 4, and 5; chest wall reconstruction with [place] patch and hilar and mediastinal lymph node sampling on on 6/21/22. Path revealed squamous cell carcinoma, 2.5 cm, G2, +visceral pleura invasion, and invasion of adjacent rib, all margins and LNs are negative, [id number].  Post op, followed with Dr. Barrett; Continue surveillance for now.  CT Chest on 9/12/22: - Post op changes - Mild opacity surrounding the chain sutures and 1 cm nodular subpleural opacity within posterior RUL(series 2 image 42)  PET/CT on 12/14/2022: (Ordered by Dr. Barrett) - Patient is status post interval partial resection of the right upper lobe.  - There is a mildly FDG-avid opacity adjacent to chain sutures (SUV 3.7; image 75). The opacity is grossly unchanged as compared to CT dated 9/12/2022, however, due to differences in technique, comparison is limited. - Previously noted 1 cm nodular subpleural opacity within the posterior right upper lobe is not clearly seen on the current study. A dedicated CT of chest is recommended to assess for interval change.  - Emphysema. - Nonspecific segmental hypermetabolism in sigmoid colon, decreased from prior study.  CT C/A/P w/ IV Contrast on 04/13/2023: - Status post posterior right upper and superior segment right lower lobe wedge resection.  - Previously noted perisutural nodularity has decreased (2:33) since October 2022.  - Emphysema is present. - Stable right supraclavicular, upper and lower right paratracheal, AP window nodes, subcarinal and right hilar nodes measuring up to 1.1 cm short axis.  - Moderate-sized hiatal hernia. - Question an enhancing lesion in the sigmoid colon at site of previous FDG avidity on prior PET/CTs, otherwise incompletely evaluated on this study; recommend correlation with colonoscopy.  CT Chest on 12/08/2023:  - Status post wedge resections in the right lung. - Emphysematous changes are present in both lungs.  - New 0.5 cm ill-defined nodule is noted in the posterior segment of the left upper lobe.  - Small hiatal hernia is noted.  01/03/2024: New YVAN nodule, which I think is mucus. I recommend she returns to clinic in 6 months with CT Chest without contrast.   CT Chest on 03/28/2024: - Right lung wedge resections.  - Unchanged upper lobe predominant emphysema and mild traction bronchiectasis in the right lower lobe. - Resolved previously new left upper lobe nodule. - Partial right rib resections.  - Surgical graft of the posterior right chest wall. - Small hiatal hernia.  Now s/p Left chest wall mediport removal on 05/07/2024.   05/22/2024: Port removal site healing well, no sign of infection. Previously noted YVAN nodule has resolved, I recommend she repeats CT Chest without contrast in 09/2024 and return to clinic after to discuss findings.   CT Chest on 09/19/2024: - Right upper and right lower lobe sublobar resections.  - Emphysema. - Unchanged small mediastinal lymph nodes.  - Small hiatal hernia. - Partial resection of multiple right ribs. - Trace pericardial fluid. Coronary artery calcifications. Unchanged dilated right pulmonary artery.  INTERVAL Hx: Hosptalized in September, 2024 for worsening shortness of breath. Diagnosed with ARF w/ hypoxia secondary to COPD exacerbation. D/c'd on home 02. 1 bottle positive BC acinetobacter, lung source vs contaminant? repeat BC NTD - on unasyn, will dw ID re PO abx on dc  10/16/2024: Seen in office, + Shortness of breath upon exertion. Now, using supplemental 02 PRN, but has been wearing it continuously. CT imaging reviewed. Patient w/ weakened lungs due to baseline emphysema. Bronchovascular markings on current CT noted to be more dense compared to prior exam. Etiology is unclear. Discussed possibility of an infectious process vs changes related to pulmonary HTN. Recently started on sildenafil and Continues to be dependent on supplemental 02. She follows with Dr. Hernandez for medication and 02 management. Discussed repeating a non contrast in a few months to re-evaluate.  10/24/2024: Seen by Dr. Seetharamu, noted with wheeze. Started on Prednisone 20 mg daily x 7days. Recommended to follow up with pulm and cards.   Patient presents to office for clinical follow up. + Shortness of breath upon exertion. Completed course of Prednisone. Uses Home 02 PRN. No fevers, chills, lightheadedness or dizziness.

## 2024-11-13 NOTE — PHYSICAL EXAM
[] : no respiratory distress [Respiration, Rhythm And Depth] : normal respiratory rhythm and effort [Exaggerated Use Of Accessory Muscles For Inspiration] : no accessory muscle use [Auscultation Breath Sounds / Voice Sounds] : lungs were clear to auscultation bilaterally [Examination Of The Chest] : the chest was normal in appearance [Chest Visual Inspection Thoracic Asymmetry] : no chest asymmetry [Diminished Respiratory Excursion] : normal chest expansion [Involuntary Movements] : no involuntary movements were seen [Skin Color & Pigmentation] : normal skin color and pigmentation [Oriented To Time, Place, And Person] : oriented to person, place, and time
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
## 2024-12-08 NOTE — REVIEW OF SYSTEMS
[Fever] : no fever [Chills] : no chills [Fatigue] : fatigue [Recent Change In Weight] : ~T no recent weight change [Cough] : no cough [SOB on Exertion] : no shortness of breath during exertion [Negative] : Allergic/Immunologic [FreeTextEntry6] : Occasional shortness of breath, now on O2

## 2024-12-08 NOTE — PHYSICAL EXAM
Rx faxed to pharmacy on file.  [id number] [Restricted in physically strenuous activity but ambulatory and able to carry out work of a light or sedentary nature] : Status 1- Restricted in physically strenuous activity but ambulatory and able to carry out work of a light or sedentary nature, e.g., light house work, office work [Normal] : affect appropriate [de-identified] : rt thoracotomy scar [de-identified] : No paraspinal tenderness but tenderness to palpation of right scapular area (surgical site)

## 2024-12-11 NOTE — REASON FOR VISIT
[Follow-Up: _____] : a [unfilled] follow-up visit [Home] : at home, [unfilled] , at the time of the visit. [Medical Office: ([facility])___] : at the medical office located in  [Verbal consent obtained from patient] : the patient, [unfilled]

## 2024-12-11 NOTE — HISTORY OF PRESENT ILLNESS
[FreeTextEntry1] : Ms. TREV GREENE, 80 year old female, former smoker, w/ hx of Rt lung surgery for benign finding. Diagnosed with with T3 N0 Mo squamous cell cancer RUL (Jan,2022). Has established care with [facility], completed 4 cycles of Jose M adjuvant chemo 5/2022.  Now s/p Right thoracotomy; right upper lobe extended wedge resection; resection of posterior chest wall including ribs 3, 4, and 5; chest wall reconstruction with [place] patch and hilar and mediastinal lymph node sampling on on 6/21/22. Path revealed squamous cell carcinoma, 2.5 cm, G2, +visceral pleura invasion, and invasion of adjacent rib, all margins and LNs are negative, [id number].  Post op, followed with Dr. Barrett; Continue surveillance for now.  CT Chest on 9/12/22: - Post op changes - Mild opacity surrounding the chain sutures and 1 cm nodular subpleural opacity within posterior RUL(series 2 image 42)  PET/CT on 12/14/2022: (Ordered by Dr. Barrett) - Patient is status post interval partial resection of the right upper lobe.  - There is a mildly FDG-avid opacity adjacent to chain sutures (SUV 3.7; image 75). The opacity is grossly unchanged as compared to CT dated 9/12/2022, however, due to differences in technique, comparison is limited. - Previously noted 1 cm nodular subpleural opacity within the posterior right upper lobe is not clearly seen on the current study. A dedicated CT of chest is recommended to assess for interval change.  - Emphysema. - Nonspecific segmental hypermetabolism in sigmoid colon, decreased from prior study.  CT C/A/P w/ IV Contrast on 04/13/2023: - Status post posterior right upper and superior segment right lower lobe wedge resection.  - Previously noted perisutural nodularity has decreased (2:33) since October 2022.  - Emphysema is present. - Stable right supraclavicular, upper and lower right paratracheal, AP window nodes, subcarinal and right hilar nodes measuring up to 1.1 cm short axis.  - Moderate-sized hiatal hernia. - Question an enhancing lesion in the sigmoid colon at site of previous FDG avidity on prior PET/CTs, otherwise incompletely evaluated on this study; recommend correlation with colonoscopy.  CT Chest on 12/08/2023:  - Status post wedge resections in the right lung. - Emphysematous changes are present in both lungs.  - New 0.5 cm ill-defined nodule is noted in the posterior segment of the left upper lobe.  - Small hiatal hernia is noted.  01/03/2024: New YVAN nodule, which I think is mucus. I recommend she returns to clinic in 6 months with CT Chest without contrast.   CT Chest on 03/28/2024: - Right lung wedge resections.  - Unchanged upper lobe predominant emphysema and mild traction bronchiectasis in the right lower lobe. - Resolved previously new left upper lobe nodule. - Partial right rib resections.  - Surgical graft of the posterior right chest wall. - Small hiatal hernia.  Now s/p Left chest wall mediport removal on 05/07/2024.   05/22/2024: Port removal site healing well, no sign of infection. Previously noted YVAN nodule has resolved, I recommend she repeats CT Chest without contrast in 09/2024 and return to clinic after to discuss findings.   CT Chest on 09/19/2024: - Right upper and right lower lobe sublobar resections.  - Emphysema. - Unchanged small mediastinal lymph nodes.  - Small hiatal hernia. - Partial resection of multiple right ribs. - Trace pericardial fluid. Coronary artery calcifications. Unchanged dilated right pulmonary artery.  INTERVAL Hx: Hosptalized in September, 2024 for worsening shortness of breath. Diagnosed with ARF w/ hypoxia secondary to COPD exacerbation. D/c'd on home 02. 1 bottle positive BC acinetobacter, lung source vs contaminant? repeat BC NTD - on unasyn, will dw ID re PO abx on dc  10/16/2024: Seen in office, + Shortness of breath upon exertion. Now, using supplemental 02 PRN, but has been wearing it continuously. CT imaging reviewed. Patient w/ weakened lungs due to baseline emphysema. Bronchovascular markings on current CT noted to be more dense compared to prior exam. Etiology is unclear. Discussed possibility of an infectious process vs changes related to pulmonary HTN. Recently started on sildenafil and Continues to be dependent on supplemental 02. She follows with Dr. Hernandez for medication and 02 management. Discussed repeating a non contrast in a few months to re-evaluate.  10/24/2024: Seen by Dr. Barrett, noted with wheeze. Started on Prednisone 20 mg daily x 7days. Recommended to follow up with pulm and cards.   11/13/2024: Patient continues to have shortness of breath upon exertion. Has been using supplemental 02 as needed. Ambulated within the office on room air and 02 Sat noted to drop into 70's upon walking. HR maintained in 80's and patient was visibly short of breath. Instructed to use supplemental 02 to maintain 02 sat above 90%. She will be following up with Dr. Hernandez today. Instructed to return to clinic in December, via TTM for symptom check.  12/05/2024: Followed up with [facility]  Patient presents for clinical follow up via tele visit. Symptoms stable. Continues to use Home 02 upon exertion.

## 2024-12-11 NOTE — HISTORY OF PRESENT ILLNESS
[FreeTextEntry1] : Ms. TREV GREENE, 80 year old female, former smoker, w/ hx of Rt lung surgery for benign finding. Diagnosed with with T3 N0 Mo squamous cell cancer RUL (Jan,2022). Has established care with [facility], completed 4 cycles of Jose M adjuvant chemo 5/2022.  Now s/p Right thoracotomy; right upper lobe extended wedge resection; resection of posterior chest wall including ribs 3, 4, and 5; chest wall reconstruction with [place] patch and hilar and mediastinal lymph node sampling on on 6/21/22. Path revealed squamous cell carcinoma, 2.5 cm, G2, +visceral pleura invasion, and invasion of adjacent rib, all margins and LNs are negative, [id number].  Post op, followed with Dr. Barrett; Continue surveillance for now.  CT Chest on 9/12/22: - Post op changes - Mild opacity surrounding the chain sutures and 1 cm nodular subpleural opacity within posterior RUL(series 2 image 42)  PET/CT on 12/14/2022: (Ordered by Dr. Barrett) - Patient is status post interval partial resection of the right upper lobe.  - There is a mildly FDG-avid opacity adjacent to chain sutures (SUV 3.7; image 75). The opacity is grossly unchanged as compared to CT dated 9/12/2022, however, due to differences in technique, comparison is limited. - Previously noted 1 cm nodular subpleural opacity within the posterior right upper lobe is not clearly seen on the current study. A dedicated CT of chest is recommended to assess for interval change.  - Emphysema. - Nonspecific segmental hypermetabolism in sigmoid colon, decreased from prior study.  CT C/A/P w/ IV Contrast on 04/13/2023: - Status post posterior right upper and superior segment right lower lobe wedge resection.  - Previously noted perisutural nodularity has decreased (2:33) since October 2022.  - Emphysema is present. - Stable right supraclavicular, upper and lower right paratracheal, AP window nodes, subcarinal and right hilar nodes measuring up to 1.1 cm short axis.  - Moderate-sized hiatal hernia. - Question an enhancing lesion in the sigmoid colon at site of previous FDG avidity on prior PET/CTs, otherwise incompletely evaluated on this study; recommend correlation with colonoscopy.  CT Chest on 12/08/2023:  - Status post wedge resections in the right lung. - Emphysematous changes are present in both lungs.  - New 0.5 cm ill-defined nodule is noted in the posterior segment of the left upper lobe.  - Small hiatal hernia is noted.  01/03/2024: New YVAN nodule, which I think is mucus. I recommend she returns to clinic in 6 months with CT Chest without contrast.   CT Chest on 03/28/2024: - Right lung wedge resections.  - Unchanged upper lobe predominant emphysema and mild traction bronchiectasis in the right lower lobe. - Resolved previously new left upper lobe nodule. - Partial right rib resections.  - Surgical graft of the posterior right chest wall. - Small hiatal hernia.  Now s/p Left chest wall mediport removal on 05/07/2024.   05/22/2024: Port removal site healing well, no sign of infection. Previously noted YVAN nodule has resolved, I recommend she repeats CT Chest without contrast in 09/2024 and return to clinic after to discuss findings.   CT Chest on 09/19/2024: - Right upper and right lower lobe sublobar resections.  - Emphysema. - Unchanged small mediastinal lymph nodes.  - Small hiatal hernia. - Partial resection of multiple right ribs. - Trace pericardial fluid. Coronary artery calcifications. Unchanged dilated right pulmonary artery.  INTERVAL Hx: Hosptalized in September, 2024 for worsening shortness of breath. Diagnosed with ARF w/ hypoxia secondary to COPD exacerbation. D/c'd on home 02. 1 bottle positive BC acinetobacter, lung source vs contaminant? repeat BC NTD - on unasyn, will dw ID re PO abx on dc  10/16/2024: Seen in office, + Shortness of breath upon exertion. Now, using supplemental 02 PRN, but has been wearing it continuously. CT imaging reviewed. Patient w/ weakened lungs due to baseline emphysema. Bronchovascular markings on current CT noted to be more dense compared to prior exam. Etiology is unclear. Discussed possibility of an infectious process vs changes related to pulmonary HTN. Recently started on sildenafil and Continues to be dependent on supplemental 02. She follows with Dr. Hernandez for medication and 02 management. Discussed repeating a non contrast in a few months to re-evaluate.  10/24/2024: Seen by Dr. Barrett, noted with wheeze. Started on Prednisone 20 mg daily x 7days. Recommended to follow up with pulm and cards.   11/13/2024: Patient continues to have shortness of breath upon exertion. Has been using supplemental 02 as needed. Ambulated within the office on room air and 02 Sat noted to drop into 70's upon walking. HR maintained in 80's and patient was visibly short of breath. Instructed to use supplemental 02 to maintain 02 sat above 90%. She will be following up with Dr. Hernandez today. Instructed to return to clinic in December, via TTM for symptom check.  12/05/2024: Followed up with [facility]  Patient presents for clinical follow up via tele visit. Symptoms stable. Continues to use Home 02 upon exertion.

## 2024-12-11 NOTE — ASSESSMENT
[FreeTextEntry1] : Ms. TREV GREENE, 80 year old female, former smoker, w/ hx of Rt lung surgery for benign finding. Diagnosed with with T3 N0 Mo squamous cell cancer RUL (Jan,2022). Has established care with [facility], completed 4 cycles of Jose M adjuvant chemo 5/2022.  Now s/p Right thoracotomy; right upper lobe extended wedge resection; resection of posterior chest wall including ribs 3, 4, and 5; chest wall reconstruction with [place] patch and hilar and mediastinal lymph node sampling on on 6/21/22. Path revealed squamous cell carcinoma, 2.5 cm, G2, +visceral pleura invasion, and invasion of adjacent rib, all margins and LNs are negative, [id number].  Post op, followed with Dr. Barrett; Continue surveillance for now.  Now s/p Left chest wall mediport removal on 05/07/2024.   Here today for clinical follow up via tele visit.   I have independently reviewed the medical records and imaging at the time of this office consultation. Symptoms stable. Continues to use home 02 as needed. Discussed repeating a non contrast CT Chest in March, 2025 to re-evaluate findings. She is agreeable.   I, WILLIAM Mancera, personally performed the evaluation and management (E/M) services for this established patient. That E/M includes assessing all new/exacerbated conditions, and establishing a new plan of care. Today, My ACP, Dinora Hernandez, was here to observe my evaluation and management services for this patient to be followed going forward.

## 2024-12-11 NOTE — ASSESSMENT
[FreeTextEntry1] : Ms. TREV GREENE, 80 year old female, former smoker, w/ hx of Rt lung surgery for benign finding. Diagnosed with with T3 N0 Mo squamous cell cancer RUL (Jan,2022). Has established care with [facility], completed 4 cycles of Jose M adjuvant chemo 5/2022.  Now s/p Right thoracotomy; right upper lobe extended wedge resection; resection of posterior chest wall including ribs 3, 4, and 5; chest wall reconstruction with [place] patch and hilar and mediastinal lymph node sampling on on 6/21/22. Path revealed squamous cell carcinoma, 2.5 cm, G2, +visceral pleura invasion, and invasion of adjacent rib, all margins and LNs are negative, [id number].  Post op, followed with Dr. Barrett; Continue surveillance for now.  Now s/p Left chest wall mediport removal on 05/07/2024.   Here today for clinical follow up via tele visit.   I have independently reviewed the medical records and imaging at the time of this office consultation. Symptoms stable. Continues to use home 02 as needed. Discussed repeating a non contrast CT Chest in March, 2025 to re-evaluate findings. She is agreeable.   I, WILLIAM Mancera, personally performed the evaluation and management (E/M) services for this established patient. That E/M includes assessing all new/exacerbated conditions, and establishing a new plan of care. Today, My ACP, Dinora Hernandez, was here to observe my evaluation and management services for this patient to be followed going forward.

## 2024-12-11 NOTE — CONSULT LETTER
[FreeTextEntry2] : Dr. Cleveland Barrett ([facility])\par  Self referred \par

## 2024-12-11 NOTE — ASSESSMENT
[FreeTextEntry1] : Ms. TREV GREENE, 80 year old female, former smoker, w/ hx of Rt lung surgery for benign finding. Diagnosed with with T3 N0 Mo squamous cell cancer RUL (Jan,2022). Has established care with [facility], completed 4 cycles of Jose M adjuvant chemo 5/2022.  Now s/p Right thoracotomy; right upper lobe extended wedge resection; resection of posterior chest wall including ribs 3, 4, and 5; chest wall reconstruction with [place] patch and hilar and mediastinal lymph node sampling on on 6/21/22. Path revealed squamous cell carcinoma, 2.5 cm, G2, +visceral pleura invasion, and invasion of adjacent rib, all margins and LNs are negative, [id number].  Post op, followed with Dr. Barrett; Continue surveillance for now.  Now s/p Left chest wall mediport removal on 05/07/2024.   Here today for clinical follow up via tele visit.   I have independently reviewed the medical records and imaging at the time of this office consultation. Symptoms stable. Continues to use home 02 as needed. Discussed repeating a non contrast CT Chest in March, 2025 to re-evaluate findings. She is agreeable.   I, WILLIAM Mancera, personally performed the evaluation and management (E/M) services for this established patient. That E/M includes assessing all new/exacerbated conditions, and establishing a new plan of care. Today, My ACP, Dinora Hernandez, was here to observe my evaluation and management services for this patient to be followed going forward.

## 2024-12-11 NOTE — REASON FOR VISIT
[Follow-Up: _____] : a [unfilled] follow-up visit [Home] : at home, [unfilled] , at the time of the visit. [Medical Office: ([facility])___] : at the medical office located in  [Verbal consent obtained from patient] : the patient, [unfilled]

## 2024-12-11 NOTE — CONSULT LETTER
[FreeTextEntry2] : Dr. Cleveland Barrett ([facility])\par  Self referred \par

## 2024-12-11 NOTE — REASON FOR VISIT
[Follow-Up: _____] : a [unfilled] follow-up visit [Home] : at home, [unfilled] , at the time of the visit. [Medical Office: ([facility])___] : at the medical office located in  [Verbal consent obtained from patient] : the patient, [unfilled]

## 2024-12-11 NOTE — HISTORY OF PRESENT ILLNESS
[FreeTextEntry1] : Ms. TREV GREENE, 80 year old female, former smoker, w/ hx of Rt lung surgery for benign finding. Diagnosed with with T3 N0 Mo squamous cell cancer RUL (Jan,2022). Has established care with [facility], completed 4 cycles of Jose M adjuvant chemo 5/2022.  Now s/p Right thoracotomy; right upper lobe extended wedge resection; resection of posterior chest wall including ribs 3, 4, and 5; chest wall reconstruction with [place] patch and hilar and mediastinal lymph node sampling on on 6/21/22. Path revealed squamous cell carcinoma, 2.5 cm, G2, +visceral pleura invasion, and invasion of adjacent rib, all margins and LNs are negative, [id number].  Post op, followed with Dr. Barrett; Continue surveillance for now.  CT Chest on 9/12/22: - Post op changes - Mild opacity surrounding the chain sutures and 1 cm nodular subpleural opacity within posterior RUL(series 2 image 42)  PET/CT on 12/14/2022: (Ordered by Dr. Barrett) - Patient is status post interval partial resection of the right upper lobe.  - There is a mildly FDG-avid opacity adjacent to chain sutures (SUV 3.7; image 75). The opacity is grossly unchanged as compared to CT dated 9/12/2022, however, due to differences in technique, comparison is limited. - Previously noted 1 cm nodular subpleural opacity within the posterior right upper lobe is not clearly seen on the current study. A dedicated CT of chest is recommended to assess for interval change.  - Emphysema. - Nonspecific segmental hypermetabolism in sigmoid colon, decreased from prior study.  CT C/A/P w/ IV Contrast on 04/13/2023: - Status post posterior right upper and superior segment right lower lobe wedge resection.  - Previously noted perisutural nodularity has decreased (2:33) since October 2022.  - Emphysema is present. - Stable right supraclavicular, upper and lower right paratracheal, AP window nodes, subcarinal and right hilar nodes measuring up to 1.1 cm short axis.  - Moderate-sized hiatal hernia. - Question an enhancing lesion in the sigmoid colon at site of previous FDG avidity on prior PET/CTs, otherwise incompletely evaluated on this study; recommend correlation with colonoscopy.  CT Chest on 12/08/2023:  - Status post wedge resections in the right lung. - Emphysematous changes are present in both lungs.  - New 0.5 cm ill-defined nodule is noted in the posterior segment of the left upper lobe.  - Small hiatal hernia is noted.  01/03/2024: New YVAN nodule, which I think is mucus. I recommend she returns to clinic in 6 months with CT Chest without contrast.   CT Chest on 03/28/2024: - Right lung wedge resections.  - Unchanged upper lobe predominant emphysema and mild traction bronchiectasis in the right lower lobe. - Resolved previously new left upper lobe nodule. - Partial right rib resections.  - Surgical graft of the posterior right chest wall. - Small hiatal hernia.  Now s/p Left chest wall mediport removal on 05/07/2024.   05/22/2024: Port removal site healing well, no sign of infection. Previously noted YVAN nodule has resolved, I recommend she repeats CT Chest without contrast in 09/2024 and return to clinic after to discuss findings.   CT Chest on 09/19/2024: - Right upper and right lower lobe sublobar resections.  - Emphysema. - Unchanged small mediastinal lymph nodes.  - Small hiatal hernia. - Partial resection of multiple right ribs. - Trace pericardial fluid. Coronary artery calcifications. Unchanged dilated right pulmonary artery.  INTERVAL Hx: Hosptalized in September, 2024 for worsening shortness of breath. Diagnosed with ARF w/ hypoxia secondary to COPD exacerbation. D/c'd on home 02. 1 bottle positive BC acinetobacter, lung source vs contaminant? repeat BC NTD - on unasyn, will dw ID re PO abx on dc  10/16/2024: Seen in office, + Shortness of breath upon exertion. Now, using supplemental 02 PRN, but has been wearing it continuously. CT imaging reviewed. Patient w/ weakened lungs due to baseline emphysema. Bronchovascular markings on current CT noted to be more dense compared to prior exam. Etiology is unclear. Discussed possibility of an infectious process vs changes related to pulmonary HTN. Recently started on sildenafil and Continues to be dependent on supplemental 02. She follows with Dr. Hernandez for medication and 02 management. Discussed repeating a non contrast in a few months to re-evaluate.  10/24/2024: Seen by Dr. Barrett, noted with wheeze. Started on Prednisone 20 mg daily x 7days. Recommended to follow up with pulm and cards.   11/13/2024: Patient continues to have shortness of breath upon exertion. Has been using supplemental 02 as needed. Ambulated within the office on room air and 02 Sat noted to drop into 70's upon walking. HR maintained in 80's and patient was visibly short of breath. Instructed to use supplemental 02 to maintain 02 sat above 90%. She will be following up with Dr. Hernandez today. Instructed to return to clinic in December, via TTM for symptom check.  12/05/2024: Followed up with [facility]  Patient presents for clinical follow up via tele visit. Symptoms stable. Continues to use Home 02 upon exertion.

## 2025-03-06 NOTE — ASSESSMENT
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 Given emerging data supporting IO in addition to chemo, we discussed- carbo AUC 5 day 1, Abraxane 100 mg/m2 days 1 and 8 and Keytruda 200 mg day 1. This treatment plan was made prior to recent approval of chemoIO in the induction space. She completed all 4 planned cycles successfully without much AEs, and with no interruptions. No AEs except for mild constipation which she managed with OTC meds. Of note, tumor was PDL1 negative, NGS showed RB1 and TP53 muts Guardant blood test showed MAPK1 mut, nothing targetable She subsequently underwent successful surgical resection There was viable cancer but significant response noted (10-20% viable tumor). There was VPI, upstaging to T3 but no other risk factors Extrapolating from Checkmate 816 data, we elected to watch closely without any further treatment We sent Bong ctDNA testing on blood, results are on hold since tumor material sent was QNS due to amount of necrotic tissue and not enough viable tumor cells. Another sample sent however unlikely to yield result. Scans since then have showb post-surgical changes and fleeting nodules, suggestive of inflammation CT Chest on 09/19/2024: - Right upper and right lower lobe sublobar resections. - Emphysema. - Unchanged small mediastinal lymph nodes. - Small hiatal hernia. - Partial resection of multiple right ribs. - Trace pericardial fluid. Coronary artery calcifications. Unchanged dilated right pulmonary artery. Unfortunately, hospitalized in September 2024 for worsening shortness of breath. Diagnosed with ARF w/ hypoxia secondary to COPD exacerbation. D/c'd on home 02. 1 bottle positive BC acinetobacter, lung source vs contaminant? repeat BC NTD Since then, has been on home O2 12/5/24: Seen in office, + Shortness of breath upon exertion. Now, using supplemental 02 24/7 but still hypoxic to 89%- 90% on 2-3L. CT imaging reviewed. Patient w/ weakened lungs due to baseline emphysema.  Encouraged pt to continue working with pulm for consideration of pulm rehab.  Bronchovascular markings on current CT noted to be denser compared to prior exam. Etiology is unclear. Pt is seeing pum, cardiology, started on new MDI which she just started Recently started on sildenafil which pt has been using.  Tapering steroids. Continue per pulm Has follow up with pulm and cardiology  Reassured pt that from cancer perspective, there is JOSSELIN Repeat scans per Dr. Colvin OV 6 months    02-26-25  Type of therapy: Coping skills, Creative arts therapy, Inspiration and motiviation, Leisure development, Self esteem, Social skills training, Stress management, Symptom management  Type of session: Individual  Level of patient participation: Resistance to participation  Duration of participation: Less than 15 minutes  Therapy conducted by: Psych rehab  Therapy Summary: Pt is familiar to the unit, usually spent time watching TV in the dayroom during previous admit. Tendency to decline invitations to RT groups when approached by writer. Pt is spending time in their room this morning and will need increased encouragement to attend group sessions.     03-05-25  Type of therapy: Coping skills, Creative arts therapy, Inspiration and motiviation, Leisure development, Self esteem, Social skills training, Stress management, Symptom management  Type of session: Individual  Level of patient participation: Participated with encouragement  Duration of participation: 15 minutes  Therapy conducted by: Psych rehab  Therapy Summary: Pt needs ongoing encouragement to attend RT sessions , mood seems to be slowly improving

## 2025-04-10 NOTE — ASSESSMENT
[FreeTextEntry1] : Ms. TREV GREENE, 80 year old female, former smoker, w/ hx of Rt lung surgery for benign finding. Diagnosed with with T3 N0 Mo squamous cell cancer RUL (Jan,2022). Has established care with [facility], completed 4 cycles of Jose M adjuvant chemo 5/2022.  Now s/p Right thoracotomy; right upper lobe extended wedge resection; resection of posterior chest wall including ribs 3, 4, and 5; chest wall reconstruction with [place] patch and hilar and mediastinal lymph node sampling on on 6/21/22. Path revealed squamous cell carcinoma, 2.5 cm, G2, +visceral pleura invasion, and invasion of adjacent rib, all margins and LNs are negative, [id number].  Post op, followed with Dr. Barrett; Continue surveillance for now.  Now s/p Left chest wall mediport removal on 05/07/2024.   Here today for follow up with imaging.  I have independently reviewed the medical records and imaging at the time of this office consultation.   Recommendations reviewed with patient during this office visit, and all questions answered; Patient instructed on the importance of follow up and verbalizes understanding.

## 2025-04-10 NOTE — CONSULT LETTER
[FreeTextEntry2] : Dr. Cleveland Barrett ([facility])\par  Self referred \par

## 2025-04-10 NOTE — HISTORY OF PRESENT ILLNESS
[FreeTextEntry1] : Ms. TREV GREENE, 80 year old female, former smoker, w/ hx of Rt lung surgery for benign finding. Diagnosed with with T3 N0 Mo squamous cell cancer RUL (Jan,2022). Has established care with [facility], completed 4 cycles of Jose M adjuvant chemo 5/2022.  Now s/p Right thoracotomy; right upper lobe extended wedge resection; resection of posterior chest wall including ribs 3, 4, and 5; chest wall reconstruction with [place] patch and hilar and mediastinal lymph node sampling on on 6/21/22. Path revealed squamous cell carcinoma, 2.5 cm, G2, +visceral pleura invasion, and invasion of adjacent rib, all margins and LNs are negative, [id number].  Post op, followed with Dr. Barrett; Continue surveillance for now.  CT Chest on 9/12/22: - Post op changes - Mild opacity surrounding the chain sutures and 1 cm nodular subpleural opacity within posterior RUL(series 2 image 42)  PET/CT on 12/14/2022: (Ordered by Dr. Barrett) - Patient is status post interval partial resection of the right upper lobe.  - There is a mildly FDG-avid opacity adjacent to chain sutures (SUV 3.7; image 75). The opacity is grossly unchanged as compared to CT dated 9/12/2022, however, due to differences in technique, comparison is limited. - Previously noted 1 cm nodular subpleural opacity within the posterior right upper lobe is not clearly seen on the current study. A dedicated CT of chest is recommended to assess for interval change.  - Emphysema. - Nonspecific segmental hypermetabolism in sigmoid colon, decreased from prior study.  CT C/A/P w/ IV Contrast on 04/13/2023: - Status post posterior right upper and superior segment right lower lobe wedge resection.  - Previously noted perisutural nodularity has decreased (2:33) since October 2022.  - Emphysema is present. - Stable right supraclavicular, upper and lower right paratracheal, AP window nodes, subcarinal and right hilar nodes measuring up to 1.1 cm short axis.  - Moderate-sized hiatal hernia. - Question an enhancing lesion in the sigmoid colon at site of previous FDG avidity on prior PET/CTs, otherwise incompletely evaluated on this study; recommend correlation with colonoscopy.  CT Chest on 12/08/2023:  - Status post wedge resections in the right lung. - Emphysematous changes are present in both lungs.  - New 0.5 cm ill-defined nodule is noted in the posterior segment of the left upper lobe.  - Small hiatal hernia is noted.  01/03/2024: New YVAN nodule, which I think is mucus. I recommend she returns to clinic in 6 months with CT Chest without contrast.   CT Chest on 03/28/2024: - Right lung wedge resections.  - Unchanged upper lobe predominant emphysema and mild traction bronchiectasis in the right lower lobe. - Resolved previously new left upper lobe nodule. - Partial right rib resections.  - Surgical graft of the posterior right chest wall. - Small hiatal hernia.  Now s/p Left chest wall mediport removal on 05/07/2024.   05/22/2024: Port removal site healing well, no sign of infection. Previously noted YVAN nodule has resolved, I recommend she repeats CT Chest without contrast in 09/2024 and return to clinic after to discuss findings.   CT Chest on 09/19/2024: - Right upper and right lower lobe sublobar resections.  - Emphysema. - Unchanged small mediastinal lymph nodes.  - Small hiatal hernia. - Partial resection of multiple right ribs. - Trace pericardial fluid. Coronary artery calcifications. Unchanged dilated right pulmonary artery.  INTERVAL Hx: Hosptalized in September, 2024 for worsening shortness of breath. Diagnosed with ARF w/ hypoxia secondary to COPD exacerbation. D/c'd on home 02. 1 bottle positive BC acinetobacter, lung source vs contaminant? repeat BC NTD - on unasyn, will dw ID re PO abx on dc  10/16/2024: Seen in office, + Shortness of breath upon exertion. Now, using supplemental 02 PRN, but has been wearing it continuously. CT imaging reviewed. Patient w/ weakened lungs due to baseline emphysema. Bronchovascular markings on current CT noted to be more dense compared to prior exam. Etiology is unclear. Discussed possibility of an infectious process vs changes related to pulmonary HTN. Recently started on sildenafil and Continues to be dependent on supplemental 02. She follows with Dr. Hernandez for medication and 02 management. Discussed repeating a non contrast in a few months to re-evaluate.  10/24/2024: Seen by Dr. Barrett, noted with wheeze. Started on Prednisone 20 mg daily x 7days. Recommended to follow up with pulm and cards.   11/13/2024: Patient continues to have shortness of breath upon exertion. Has been using supplemental 02 as needed. Ambulated within the office on room air and 02 Sat noted to drop into 70's upon walking. HR maintained in 80's and patient was visibly short of breath. Instructed to use supplemental 02 to maintain 02 sat above 90%. She will be following up with Dr. Hernandez today. Instructed to return to clinic in December, via TTM for symptom check.  12/05/2024: Followed up with [facility]  CT Chest on ... -   Patient presents for follow up via tele visit.

## 2025-06-04 NOTE — CONSULT LETTER
[FreeTextEntry2] : Dr. Cleveland Barrett ([facility])\par  Self referred \par

## 2025-06-04 NOTE — ASSESSMENT
[FreeTextEntry1] : Ms. TREV GREENE, 80 year old female, former smoker, w/ hx of Rt lung surgery for benign finding. Diagnosed with with T3 N0 Mo squamous cell cancer RUL (Jan,2022). Has established care with [facility], completed 4 cycles of Jose M adjuvant chemo 5/2022.  Now s/p Right thoracotomy; right upper lobe extended wedge resection; resection of posterior chest wall including ribs 3, 4, and 5; chest wall reconstruction with [place] patch and hilar and mediastinal lymph node sampling on on 6/21/22. Path revealed squamous cell carcinoma, 2.5 cm, G2, +visceral pleura invasion, and invasion of adjacent rib, all margins and LNs are negative, [id number].  Post op, followed with Dr. Barrett; Continue surveillance for now.  Now s/p Left chest wall mediport removal on 05/07/2024.   Here today for follow up with imaging.  I have independently reviewed the medical records and imaging at the time of this office consultation. Imaging reviewed with patient, revealing new ggo in the RUL which I suspect is infection vs inflammation. Likely resolving recent pneumonia. I would like her to repeat CT Chest without contrast in 6 months for re-evaluation. RTC after to discuss findings and next step. She is agreeable. Additionally, I discussed she should increase her supplemental O2 if needed when ambulating. Advised to continue follow up with Dr. Hernandez.   Recommendations reviewed with patient during this office visit, and all questions answered; Patient instructed on the importance of follow up and verbalizes understanding.    I, WILLIAM Mancera, personally performed the evaluation and management (E/M) services for this established patient. That E/M includes conducting the examination, assessing all new/exacerbated conditions, and establishing a new plan of care. Today, my ACP, Ziyad Griggs NP, was here to observe my evaluation and management services for this new problem/exacerbated condition to be followed going forward.

## 2025-06-04 NOTE — ASSESSMENT
[FreeTextEntry1] : Ms. TREV GREENE, 80 year old female, former smoker, w/ hx of Rt lung surgery for benign finding. Diagnosed with with T3 N0 Mo squamous cell cancer RUL (Jan,2022). Has established care with [facility], completed 4 cycles of Jose M adjuvant chemo 5/2022.  Now s/p Right thoracotomy; right upper lobe extended wedge resection; resection of posterior chest wall including ribs 3, 4, and 5; chest wall reconstruction with [place] patch and hilar and mediastinal lymph node sampling on on 6/21/22. Path revealed squamous cell carcinoma, 2.5 cm, G2, +visceral pleura invasion, and invasion of adjacent rib, all margins and LNs are negative, [id number].  Post op, followed with Dr. Barrett; Continue surveillance for now.  Now s/p Left chest wall mediport removal on 05/07/2024.   Here today for follow up with imaging.  I have independently reviewed the medical records and imaging at the time of this office consultation. Imaging reviewed with patient, revealing new ggo in the RUL which I suspect is infection vs inflammation. Likely resolving recent pneumonia. I would like her to repeat CT Chest without contrast in 6 months for re-evaluation. RTC after to discuss findings and next step. She is agreeable. Additionally, I discussed she should increase her supplemental O2 if needed when ambulating. Advised to continue follow up with Dr. Hernandez.   Recommendations reviewed with patient during this office visit, and all questions answered; Patient instructed on the importance of follow up and verbalizes understanding.    I, WILLIAM Mancera, personally performed the evaluation and management (E/M) services for this established patient. That E/M includes conducting the examination, assessing all new/exacerbated conditions, and establishing a new plan of care. Today, my ACP, Ziyad Griggs NP, was here to observe my evaluation and management services for this new problem/exacerbated condition to be followed going forward.

## 2025-06-04 NOTE — HISTORY OF PRESENT ILLNESS
[FreeTextEntry1] : Ms. TREV GREENE, 80 year old female, former smoker, w/ hx of Rt lung surgery for benign finding. Diagnosed with with T3 N0 Mo squamous cell cancer RUL (Jan,2022). Has established care with [facility], completed 4 cycles of Jose M adjuvant chemo 5/2022.  Now s/p Right thoracotomy; right upper lobe extended wedge resection; resection of posterior chest wall including ribs 3, 4, and 5; chest wall reconstruction with [place] patch and hilar and mediastinal lymph node sampling on on 6/21/22. Path revealed squamous cell carcinoma, 2.5 cm, G2, +visceral pleura invasion, and invasion of adjacent rib, all margins and LNs are negative, [id number].  Post op, followed with Dr. Barrett; Continue surveillance for now.  CT Chest on 9/12/22: - Post op changes - Mild opacity surrounding the chain sutures and 1 cm nodular subpleural opacity within posterior RUL(series 2 image 42)  PET/CT on 12/14/2022: (Ordered by Dr. Barrett) - Patient is status post interval partial resection of the right upper lobe.  - There is a mildly FDG-avid opacity adjacent to chain sutures (SUV 3.7; image 75). The opacity is grossly unchanged as compared to CT dated 9/12/2022, however, due to differences in technique, comparison is limited. - Previously noted 1 cm nodular subpleural opacity within the posterior right upper lobe is not clearly seen on the current study. A dedicated CT of chest is recommended to assess for interval change.  - Emphysema. - Nonspecific segmental hypermetabolism in sigmoid colon, decreased from prior study.  CT C/A/P w/ IV Contrast on 04/13/2023: - Status post posterior right upper and superior segment right lower lobe wedge resection.  - Previously noted perisutural nodularity has decreased (2:33) since October 2022.  - Emphysema is present. - Stable right supraclavicular, upper and lower right paratracheal, AP window nodes, subcarinal and right hilar nodes measuring up to 1.1 cm short axis.  - Moderate-sized hiatal hernia. - Question an enhancing lesion in the sigmoid colon at site of previous FDG avidity on prior PET/CTs, otherwise incompletely evaluated on this study; recommend correlation with colonoscopy.  CT Chest on 12/08/2023:  - Status post wedge resections in the right lung. - Emphysematous changes are present in both lungs.  - New 0.5 cm ill-defined nodule is noted in the posterior segment of the left upper lobe.  - Small hiatal hernia is noted.  01/03/2024: New YVAN nodule, which I think is mucus. I recommend she returns to clinic in 6 months with CT Chest without contrast.   CT Chest on 03/28/2024: - Right lung wedge resections.  - Unchanged upper lobe predominant emphysema and mild traction bronchiectasis in the right lower lobe. - Resolved previously new left upper lobe nodule. - Partial right rib resections.  - Surgical graft of the posterior right chest wall. - Small hiatal hernia.  Now s/p Left chest wall mediport removal on 05/07/2024.   05/22/2024: Port removal site healing well, no sign of infection. Previously noted YVAN nodule has resolved, I recommend she repeats CT Chest without contrast in 09/2024 and return to clinic after to discuss findings.   CT Chest on 09/19/2024: - Right upper and right lower lobe sublobar resections.  - Emphysema. - Unchanged small mediastinal lymph nodes.  - Small hiatal hernia. - Partial resection of multiple right ribs. - Trace pericardial fluid. Coronary artery calcifications. Unchanged dilated right pulmonary artery.  INTERVAL Hx: Hosptalized in September, 2024 for worsening shortness of breath. Diagnosed with ARF w/ hypoxia secondary to COPD exacerbation. D/c'd on home 02. 1 bottle positive BC acinetobacter, lung source vs contaminant? repeat BC NTD - on unasyn, will dw ID re PO abx on dc  10/16/2024: Seen in office, + Shortness of breath upon exertion. Now, using supplemental 02 PRN, but has been wearing it continuously. CT imaging reviewed. Patient w/ weakened lungs due to baseline emphysema. Bronchovascular markings on current CT noted to be more dense compared to prior exam. Etiology is unclear. Discussed possibility of an infectious process vs changes related to pulmonary HTN. Recently started on sildenafil and Continues to be dependent on supplemental 02. She follows with Dr. Hernandez for medication and 02 management. Discussed repeating a non contrast in a few months to re-evaluate.  10/24/2024: Seen by Dr. Seetharamu, noted with wheeze. Started on Prednisone 20 mg daily x 7days. Recommended to follow up with pulm and cards.   11/13/2024: Patient continues to have shortness of breath upon exertion. Has been using supplemental 02 as needed. Ambulated within the office on room air and 02 Sat noted to drop into 70's upon walking. HR maintained in 80's and patient was visibly short of breath. Instructed to use supplemental 02 to maintain 02 sat above 90%. She will be following up with Dr. Hernandez today. Instructed to return to clinic in December, via TTM for symptom check.  12/11/2024: Symptoms stable. Continues to use home 02 as needed. Discussed repeating a non contrast CT Chest in March, 2025 to re-evaluate findings.   03/2025: She was admitted for 2 weeks in [place] with pneumonia and COPD exacerbation. S/p antibiotics.   CT Chest on 05/16/2025: - Emphysema. Partial resections of the right upper lobe and right lower lobe.  - New patchy ground-glass opacity in the right upper lobe posteriorly (image 35, series 2). - Multiple small mediastinal nodes. - Small hiatal hernia.   Patient presents for follow up. Reports ongoing shortness of breath with exertion, on continuous 2L O2. Denies any cough, fever, or chills.

## 2025-06-04 NOTE — HISTORY OF PRESENT ILLNESS
[FreeTextEntry1] : Ms. TREV GREENE, 80 year old female, former smoker, w/ hx of Rt lung surgery for benign finding. Diagnosed with with T3 N0 Mo squamous cell cancer RUL (Jan,2022). Has established care with [facility], completed 4 cycles of Jose M adjuvant chemo 5/2022.  Now s/p Right thoracotomy; right upper lobe extended wedge resection; resection of posterior chest wall including ribs 3, 4, and 5; chest wall reconstruction with [place] patch and hilar and mediastinal lymph node sampling on on 6/21/22. Path revealed squamous cell carcinoma, 2.5 cm, G2, +visceral pleura invasion, and invasion of adjacent rib, all margins and LNs are negative, [id number].  Post op, followed with Dr. Barrett; Continue surveillance for now.  CT Chest on 9/12/22: - Post op changes - Mild opacity surrounding the chain sutures and 1 cm nodular subpleural opacity within posterior RUL(series 2 image 42)  PET/CT on 12/14/2022: (Ordered by Dr. Barrett) - Patient is status post interval partial resection of the right upper lobe.  - There is a mildly FDG-avid opacity adjacent to chain sutures (SUV 3.7; image 75). The opacity is grossly unchanged as compared to CT dated 9/12/2022, however, due to differences in technique, comparison is limited. - Previously noted 1 cm nodular subpleural opacity within the posterior right upper lobe is not clearly seen on the current study. A dedicated CT of chest is recommended to assess for interval change.  - Emphysema. - Nonspecific segmental hypermetabolism in sigmoid colon, decreased from prior study.  CT C/A/P w/ IV Contrast on 04/13/2023: - Status post posterior right upper and superior segment right lower lobe wedge resection.  - Previously noted perisutural nodularity has decreased (2:33) since October 2022.  - Emphysema is present. - Stable right supraclavicular, upper and lower right paratracheal, AP window nodes, subcarinal and right hilar nodes measuring up to 1.1 cm short axis.  - Moderate-sized hiatal hernia. - Question an enhancing lesion in the sigmoid colon at site of previous FDG avidity on prior PET/CTs, otherwise incompletely evaluated on this study; recommend correlation with colonoscopy.  CT Chest on 12/08/2023:  - Status post wedge resections in the right lung. - Emphysematous changes are present in both lungs.  - New 0.5 cm ill-defined nodule is noted in the posterior segment of the left upper lobe.  - Small hiatal hernia is noted.  01/03/2024: New YVAN nodule, which I think is mucus. I recommend she returns to clinic in 6 months with CT Chest without contrast.   CT Chest on 03/28/2024: - Right lung wedge resections.  - Unchanged upper lobe predominant emphysema and mild traction bronchiectasis in the right lower lobe. - Resolved previously new left upper lobe nodule. - Partial right rib resections.  - Surgical graft of the posterior right chest wall. - Small hiatal hernia.  Now s/p Left chest wall mediport removal on 05/07/2024.   05/22/2024: Port removal site healing well, no sign of infection. Previously noted YVAN nodule has resolved, I recommend she repeats CT Chest without contrast in 09/2024 and return to clinic after to discuss findings.   CT Chest on 09/19/2024: - Right upper and right lower lobe sublobar resections.  - Emphysema. - Unchanged small mediastinal lymph nodes.  - Small hiatal hernia. - Partial resection of multiple right ribs. - Trace pericardial fluid. Coronary artery calcifications. Unchanged dilated right pulmonary artery.  INTERVAL Hx: Hosptalized in September, 2024 for worsening shortness of breath. Diagnosed with ARF w/ hypoxia secondary to COPD exacerbation. D/c'd on home 02. 1 bottle positive BC acinetobacter, lung source vs contaminant? repeat BC NTD - on unasyn, will dw ID re PO abx on dc  10/16/2024: Seen in office, + Shortness of breath upon exertion. Now, using supplemental 02 PRN, but has been wearing it continuously. CT imaging reviewed. Patient w/ weakened lungs due to baseline emphysema. Bronchovascular markings on current CT noted to be more dense compared to prior exam. Etiology is unclear. Discussed possibility of an infectious process vs changes related to pulmonary HTN. Recently started on sildenafil and Continues to be dependent on supplemental 02. She follows with Dr. Hernandez for medication and 02 management. Discussed repeating a non contrast in a few months to re-evaluate.  10/24/2024: Seen by Dr. Seetharamu, noted with wheeze. Started on Prednisone 20 mg daily x 7days. Recommended to follow up with pulm and cards.   11/13/2024: Patient continues to have shortness of breath upon exertion. Has been using supplemental 02 as needed. Ambulated within the office on room air and 02 Sat noted to drop into 70's upon walking. HR maintained in 80's and patient was visibly short of breath. Instructed to use supplemental 02 to maintain 02 sat above 90%. She will be following up with Dr. Hernandez today. Instructed to return to clinic in December, via TTM for symptom check.  12/11/2024: Symptoms stable. Continues to use home 02 as needed. Discussed repeating a non contrast CT Chest in March, 2025 to re-evaluate findings.   03/2025: She was admitted for 2 weeks in [place] with pneumonia and COPD exacerbation. S/p antibiotics.   CT Chest on 05/16/2025: - Emphysema. Partial resections of the right upper lobe and right lower lobe.  - New patchy ground-glass opacity in the right upper lobe posteriorly (image 35, series 2). - Multiple small mediastinal nodes. - Small hiatal hernia.   Patient presents for follow up. Reports ongoing shortness of breath with exertion, on continuous 2L O2. Denies any cough, fever, or chills.

## 2025-06-12 NOTE — REVIEW OF SYSTEMS
[Fever] : no fever [Chills] : no chills [Fatigue] : fatigue [Recent Change In Weight] : ~T no recent weight change [Cough] : no cough [SOB on Exertion] : no shortness of breath during exertion [Muscle Weakness] : muscle weakness [Difficulty Walking] : difficulty walking [Negative] : Allergic/Immunologic [FreeTextEntry6] : Occasional shortness of breath, now on O2

## 2025-06-12 NOTE — PHYSICAL EXAM
[Capable of only limited self care, confined to bed or chair more than 50% of waking hours] : Status 3- Capable of only limited self care, confined to bed or chair more than 50% of waking hours [Normal] : affect appropriate [de-identified] : cushingoid facies [de-identified] : O2  [de-identified] : No paraspinal tenderness but tenderness to palpation of right scapular area (surgical site) [de-identified] : rt thoracotomy scar [de-identified] : LE weakness
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
## 2025-06-12 NOTE — ASSESSMENT
[FreeTextEntry1] : 81 yo w, former smoker, with recently diagnosed sq cell ca of the lung, T3N0M0 s/p med which showed no LN involvement. Given emerging data supporting IO in addition to chemo, we discussed- carbo AUC 5 day 1, Abraxane 100 mg/m2 days 1 and 8 and Keytruda 200 mg day 1. This treatment plan was made prior to recent approval of chemoIO in the induction space. She completed all 4 planned cycles successfully without much AEs, and with no interruptions. No AEs except for mild constipation which she managed with OTC meds. Of note, tumor was PDL1 negative, NGS showed RB1 and TP53 muts Guardant blood test showed MAPK1 mut, nothing targetable She subsequently underwent successful surgical resection There was viable cancer but significant response noted (10-20% viable tumor). There was VPI, upstaging to T3 but no other risk factors Extrapolating from Checkmate 816 data, we elected to watch closely without any further treatment We sent Bong ctDNA testing on blood, results are on hold since tumor material sent was QNS due to amount of necrotic tissue and not enough viable tumor cells. Another sample sent however unlikely to yield result. Scans since then have showb post-surgical changes and fleeting nodules, suggestive of inflammation CT Chest on 09/19/2024: - Right upper and right lower lobe sublobar resections. - Emphysema. - Unchanged small mediastinal lymph nodes. - Small hiatal hernia. - Partial resection of multiple right ribs. - Trace pericardial fluid. Coronary artery calcifications. Unchanged dilated right pulmonary artery. Unfortunately, hospitalized in September 2024 for worsening shortness of breath. Diagnosed with ARF w/ hypoxia secondary to COPD exacerbation. D/c'd on home 02. 1 bottle positive BC acinetobacter, lung source vs contaminant? repeat BC NTD Since then, has been on home O2 12/5/24: Seen in office, + Shortness of breath upon exertion. Now, using supplemental 02 24/7 but still hypoxic to 89%- 90% on 2-3L. CT imaging reviewed. Patient w/ weakened lungs due to baseline emphysema.  Encouraged pt to continue working with pulm for consideration of pulm rehab.  Bronchovascular markings on current CT noted to be denser compared to prior exam. Etiology is unclear. Pt is seeing pum, cardiology, started on new MDI which she just started Recently started on sildenafil which pt has been using.  She was admitted again in March for 2 weeks in [place] with pneumonia and COPD exacerbation. S/p antibiotics. Was started with abx and prolonged course of steroids, which she is continuing to taper. She has LE weakness and not able to ambulate.  CT Chest on 05/16/2025: - Emphysema. Partial resections of the right upper lobe and right lower lobe. - New patchy ground-glass opacity in the right upper lobe posteriorly (image 35, series 2). - Multiple small mediastinal nodes. - Small hiatal hernia. Patient presents for follow up. Reports ongoing shortness of breath with exertion, on continuous 2L O2. Denies any cough, fever, or chills. LE swelling likely from steroids LE weakness, likely proximal myopathy from steroids However, will get scans- MR TL spine and PET/CT severe fatigue- likely sec to multiple issues, deconditioning, and possibly some steroid withdrawal Pt has private aide services and is getting PT/OT

## 2025-07-24 NOTE — REVIEW OF SYSTEMS
[Fatigue] : fatigue [Muscle Weakness] : muscle weakness [Difficulty Walking] : difficulty walking [Negative] : Allergic/Immunologic [Fever] : no fever [Chills] : no chills [Recent Change In Weight] : ~T no recent weight change [Cough] : no cough [SOB on Exertion] : no shortness of breath during exertion [FreeTextEntry6] : Occasional shortness of breath, now on O2

## 2025-07-24 NOTE — PHYSICAL EXAM
[Capable of only limited self care, confined to bed or chair more than 50% of waking hours] : Status 3- Capable of only limited self care, confined to bed or chair more than 50% of waking hours [Normal] : affect appropriate [de-identified] : cushingoid facies [de-identified] : O2  [de-identified] : No paraspinal tenderness but tenderness to palpation of right scapular area (surgical site) [de-identified] : rt thoracotomy scar [de-identified] : LE weakness

## 2025-07-24 NOTE — ASSESSMENT
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 Given emerging data supporting IO in addition to chemo, we discussed- carbo AUC 5 day 1, Abraxane 100 mg/m2 days 1 and 8 and Keytruda 200 mg day 1. This treatment plan was made prior to recent approval of chemoIO in the induction space. She completed all 4 planned cycles successfully without much AEs, and with no interruptions. No AEs except for mild constipation which she managed with OTC meds. Of note, tumor was PDL1 negative, NGS showed RB1 and TP53 muts Guardant blood test showed MAPK1 mut, nothing targetable She subsequently underwent successful surgical resection There was viable cancer but significant response noted (10-20% viable tumor). There was VPI, upstaging to T3 but no other risk factors Extrapolating from Checkmate 816 data, we elected to watch closely without any further treatment We sent Bong ctDNA testing on blood, results are on hold since tumor material sent was QNS due to amount of necrotic tissue and not enough viable tumor cells. Another sample sent however unlikely to yield result. Scans since then have showb post-surgical changes and fleeting nodules, suggestive of inflammation CT Chest on 09/19/2024: - Right upper and right lower lobe sublobar resections. - Emphysema. - Unchanged small mediastinal lymph nodes. - Small hiatal hernia. - Partial resection of multiple right ribs. - Trace pericardial fluid. Coronary artery calcifications. Unchanged dilated right pulmonary artery. Unfortunately, hospitalized in September 2024 for worsening shortness of breath. Diagnosed with ARF w/ hypoxia secondary to COPD exacerbation. D/c'd on home 02. 1 bottle positive BC acinetobacter, lung source vs contaminant? repeat BC NTD Since then, has been on home O2 12/5/24: Seen in office, + Shortness of breath upon exertion. Now, using supplemental 02 24/7 but still hypoxic to 89%- 90% on 2-3L. CT imaging reviewed. Patient w/ weakened lungs due to baseline emphysema.  Encouraged pt to continue working with pulm for consideration of pulm rehab.  Bronchovascular markings on current CT noted to be denser compared to prior exam. Etiology is unclear. Pt is seeing pum, cardiology, started on new MDI which she just started Recently started on sildenafil which pt has been using.  She was admitted again in March for 2 weeks in [place] with pneumonia and COPD exacerbation. S/p antibiotics. Was started with abx and prolonged course of steroids, which she is continuing to taper. She has LE weakness and not able to ambulate.  CT Chest on 05/16/2025: - Emphysema. Partial resections of the right upper lobe and right lower lobe. - New patchy ground-glass opacity in the right upper lobe posteriorly (image 35, series 2). - Multiple small mediastinal nodes. - Small hiatal hernia. Patient presents for follow up. Reports ongoing shortness of breath with exertion, on continuous 2L O2. Denies any cough, fever, or chills. LE swelling likely from steroids LE weakness, likely proximal myopathy from steroids However, will get scans- MR TL spine and PET/CT severe fatigue- likely sec to multiple issues, deconditioning, and possibly some steroid withdrawal Pt has private aide services and is getting PT/OT
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
## 2025-07-24 NOTE — PHYSICAL EXAM
[Capable of only limited self care, confined to bed or chair more than 50% of waking hours] : Status 3- Capable of only limited self care, confined to bed or chair more than 50% of waking hours [Normal] : affect appropriate [de-identified] : cushingoid facies [de-identified] : O2  [de-identified] : No paraspinal tenderness but tenderness to palpation of right scapular area (surgical site) [de-identified] : rt thoracotomy scar [de-identified] : LE weakness